# Patient Record
Sex: MALE | Race: WHITE | URBAN - METROPOLITAN AREA
[De-identification: names, ages, dates, MRNs, and addresses within clinical notes are randomized per-mention and may not be internally consistent; named-entity substitution may affect disease eponyms.]

---

## 2023-03-19 ENCOUNTER — HOSPITAL ENCOUNTER (INPATIENT)
Age: 78
DRG: 560 | End: 2023-03-19
Attending: PHYSICAL MEDICINE & REHABILITATION | Admitting: PHYSICAL MEDICINE & REHABILITATION
Payer: MEDICARE

## 2023-03-19 DIAGNOSIS — M51.16 LUMBAR DISC DISEASE WITH RADICULOPATHY: Primary | ICD-10-CM

## 2023-03-19 LAB
GLUCOSE BLD-MCNC: 134 MG/DL (ref 70–99)
GLUCOSE BLD-MCNC: 209 MG/DL (ref 70–99)
PERFORMED ON: ABNORMAL
PERFORMED ON: ABNORMAL

## 2023-03-19 PROCEDURE — 6370000000 HC RX 637 (ALT 250 FOR IP): Performed by: PHYSICAL MEDICINE & REHABILITATION

## 2023-03-19 PROCEDURE — 1280000000 HC REHAB R&B

## 2023-03-19 RX ORDER — TAMSULOSIN HYDROCHLORIDE 0.4 MG/1
0.4 CAPSULE ORAL DAILY
Status: ON HOLD | COMMUNITY
End: 2023-03-30 | Stop reason: SDUPTHER

## 2023-03-19 RX ORDER — LISINOPRIL 40 MG/1
40 TABLET ORAL DAILY
Status: ON HOLD | COMMUNITY
End: 2023-03-30 | Stop reason: SDUPTHER

## 2023-03-19 RX ORDER — ACETAMINOPHEN 325 MG/1
650 TABLET ORAL EVERY 6 HOURS PRN
Status: DISCONTINUED | OUTPATIENT
Start: 2023-03-19 | End: 2023-03-31 | Stop reason: HOSPADM

## 2023-03-19 RX ORDER — ATENOLOL 50 MG/1
50 TABLET ORAL DAILY
Status: DISCONTINUED | OUTPATIENT
Start: 2023-03-20 | End: 2023-03-31 | Stop reason: HOSPADM

## 2023-03-19 RX ORDER — AMLODIPINE BESYLATE 10 MG/1
10 TABLET ORAL DAILY
Status: DISCONTINUED | OUTPATIENT
Start: 2023-03-20 | End: 2023-03-31 | Stop reason: HOSPADM

## 2023-03-19 RX ORDER — LANOLIN ALCOHOL/MO/W.PET/CERES
3 CREAM (GRAM) TOPICAL NIGHTLY PRN
Status: DISCONTINUED | OUTPATIENT
Start: 2023-03-19 | End: 2023-03-31 | Stop reason: HOSPADM

## 2023-03-19 RX ORDER — DEXTROSE MONOHYDRATE 100 MG/ML
INJECTION, SOLUTION INTRAVENOUS CONTINUOUS PRN
Status: DISCONTINUED | OUTPATIENT
Start: 2023-03-19 | End: 2023-03-31 | Stop reason: HOSPADM

## 2023-03-19 RX ORDER — OXYCODONE HYDROCHLORIDE 10 MG/1
10 TABLET ORAL EVERY 4 HOURS PRN
Status: DISCONTINUED | OUTPATIENT
Start: 2023-03-19 | End: 2023-03-31 | Stop reason: HOSPADM

## 2023-03-19 RX ORDER — BISACODYL 10 MG
10 SUPPOSITORY, RECTAL RECTAL DAILY PRN
Status: DISCONTINUED | OUTPATIENT
Start: 2023-03-19 | End: 2023-03-31 | Stop reason: HOSPADM

## 2023-03-19 RX ORDER — AMLODIPINE BESYLATE 10 MG/1
10 TABLET ORAL DAILY
Status: ON HOLD | COMMUNITY
End: 2023-03-30 | Stop reason: HOSPADM

## 2023-03-19 RX ORDER — TAMSULOSIN HYDROCHLORIDE 0.4 MG/1
0.8 CAPSULE ORAL DAILY
Status: DISCONTINUED | OUTPATIENT
Start: 2023-03-20 | End: 2023-03-31 | Stop reason: HOSPADM

## 2023-03-19 RX ORDER — DIAZEPAM 5 MG/1
5 TABLET ORAL EVERY 12 HOURS PRN
Status: ACTIVE | OUTPATIENT
Start: 2023-03-19 | End: 2023-03-22

## 2023-03-19 RX ORDER — ATENOLOL 50 MG/1
50 TABLET ORAL DAILY
COMMUNITY

## 2023-03-19 RX ORDER — METHOCARBAMOL 750 MG/1
750 TABLET, FILM COATED ORAL 4 TIMES DAILY
Status: DISCONTINUED | OUTPATIENT
Start: 2023-03-19 | End: 2023-03-31 | Stop reason: HOSPADM

## 2023-03-19 RX ORDER — LISINOPRIL 40 MG/1
40 TABLET ORAL DAILY
Status: DISCONTINUED | OUTPATIENT
Start: 2023-03-20 | End: 2023-03-24

## 2023-03-19 RX ORDER — HYDRALAZINE HYDROCHLORIDE 10 MG/1
10 TABLET, FILM COATED ORAL EVERY 6 HOURS PRN
Status: DISCONTINUED | OUTPATIENT
Start: 2023-03-19 | End: 2023-03-31 | Stop reason: HOSPADM

## 2023-03-19 RX ORDER — OXYCODONE HYDROCHLORIDE 5 MG/1
5 TABLET ORAL EVERY 4 HOURS PRN
Status: DISCONTINUED | OUTPATIENT
Start: 2023-03-19 | End: 2023-03-31 | Stop reason: HOSPADM

## 2023-03-19 RX ORDER — ONDANSETRON 4 MG/1
4 TABLET, FILM COATED ORAL EVERY 8 HOURS PRN
Status: DISCONTINUED | OUTPATIENT
Start: 2023-03-19 | End: 2023-03-31 | Stop reason: HOSPADM

## 2023-03-19 RX ORDER — ALLOPURINOL 300 MG/1
300 TABLET ORAL DAILY
Status: DISCONTINUED | OUTPATIENT
Start: 2023-03-20 | End: 2023-03-31 | Stop reason: HOSPADM

## 2023-03-19 RX ORDER — ROSUVASTATIN CALCIUM 10 MG/1
5 TABLET, COATED ORAL NIGHTLY
Status: DISCONTINUED | OUTPATIENT
Start: 2023-03-20 | End: 2023-03-31 | Stop reason: HOSPADM

## 2023-03-19 RX ORDER — INSULIN LISPRO 100 [IU]/ML
0-4 INJECTION, SOLUTION INTRAVENOUS; SUBCUTANEOUS
Status: DISCONTINUED | OUTPATIENT
Start: 2023-03-19 | End: 2023-03-31 | Stop reason: HOSPADM

## 2023-03-19 RX ORDER — ATORVASTATIN CALCIUM 10 MG/1
10 TABLET, FILM COATED ORAL DAILY
Status: ON HOLD | COMMUNITY
End: 2023-03-20

## 2023-03-19 RX ORDER — POLYETHYLENE GLYCOL 3350 17 G/17G
17 POWDER, FOR SOLUTION ORAL DAILY PRN
Status: DISCONTINUED | OUTPATIENT
Start: 2023-03-19 | End: 2023-03-31 | Stop reason: HOSPADM

## 2023-03-19 RX ORDER — SENNA AND DOCUSATE SODIUM 50; 8.6 MG/1; MG/1
1 TABLET, FILM COATED ORAL 2 TIMES DAILY
Status: DISCONTINUED | OUTPATIENT
Start: 2023-03-19 | End: 2023-03-22

## 2023-03-19 RX ORDER — INSULIN LISPRO 100 [IU]/ML
0-4 INJECTION, SOLUTION INTRAVENOUS; SUBCUTANEOUS NIGHTLY
Status: DISCONTINUED | OUTPATIENT
Start: 2023-03-19 | End: 2023-03-31 | Stop reason: HOSPADM

## 2023-03-19 RX ORDER — ALLOPURINOL 300 MG/1
300 TABLET ORAL DAILY
COMMUNITY

## 2023-03-19 RX ADMIN — METFORMIN HYDROCHLORIDE 500 MG: 500 TABLET ORAL at 16:29

## 2023-03-19 RX ADMIN — Medication 3 MG: at 20:45

## 2023-03-19 RX ADMIN — OXYCODONE 5 MG: 5 TABLET ORAL at 20:45

## 2023-03-19 RX ADMIN — OXYCODONE 5 MG: 5 TABLET ORAL at 15:04

## 2023-03-19 RX ADMIN — METHOCARBAMOL TABLETS 750 MG: 750 TABLET, COATED ORAL at 16:29

## 2023-03-19 RX ADMIN — METHOCARBAMOL TABLETS 750 MG: 750 TABLET, COATED ORAL at 20:45

## 2023-03-19 RX ADMIN — SENNOSIDES AND DOCUSATE SODIUM 1 TABLET: 50; 8.6 TABLET ORAL at 20:45

## 2023-03-19 ASSESSMENT — PAIN DESCRIPTION - LOCATION
LOCATION: BACK
LOCATION: BACK

## 2023-03-19 ASSESSMENT — PAIN DESCRIPTION - FREQUENCY
FREQUENCY: INTERMITTENT
FREQUENCY: CONTINUOUS

## 2023-03-19 ASSESSMENT — PAIN DESCRIPTION - ORIENTATION
ORIENTATION: MID
ORIENTATION: MID

## 2023-03-19 ASSESSMENT — PAIN DESCRIPTION - PAIN TYPE
TYPE: ACUTE PAIN;SURGICAL PAIN
TYPE: SURGICAL PAIN

## 2023-03-19 ASSESSMENT — PAIN DESCRIPTION - DESCRIPTORS
DESCRIPTORS: ACHING
DESCRIPTORS: ACHING

## 2023-03-19 ASSESSMENT — PAIN SCALES - GENERAL
PAINLEVEL_OUTOF10: 2
PAINLEVEL_OUTOF10: 5
PAINLEVEL_OUTOF10: 6

## 2023-03-19 ASSESSMENT — PAIN DESCRIPTION - ONSET
ONSET: ON-GOING
ONSET: ON-GOING

## 2023-03-19 ASSESSMENT — PAIN - FUNCTIONAL ASSESSMENT: PAIN_FUNCTIONAL_ASSESSMENT: ACTIVITIES ARE NOT PREVENTED

## 2023-03-19 NOTE — PROGRESS NOTES
ADMISSION ORIENTATION    [facility] RECORD NUMBER:  [id number]  AGE: 78 y.o. GENDER: male  : 1945  TODAYS DATE:  3/19/2023      Room Orientation     Patient admitted to room 3273 per [] Wheel Chair  [] Bed  [] Recliner  [x] Stretcher  [] Other:. Transferred to:  [x] Bed  [] Recliner  [] Other: .      Patient Required minimum assistance with Walker    Patient was oriented to:  [x] Call Light  [x] Room Phone  [x] TV  [x] Thermostat  [x] Bathroom  [x] Bed and Chair Alarms per Rehab Policy  [x] Closet  [x] Silva Soup and it's Use on Rehab  [] Other:    Patient Verbalized Understanding: Yes  Family Present: Yes      Rehab Orientation     [x] 3 Hours of Therapy  through   [x] Focus and Specialty of Physical, Occupational, and Speech and Language Pathology  [x] Weekly Team Conference and Discharge Planning  [x] Roles of the Rehab Doctor, Consulting Doctors, Nursing, Dietary, and Social Work  [x] Everyday Clothing, including proper footwear, for Therapy  [x] Visiting Hours, Visitor Ages, and Visitors Sleeping Overnight in the Hospital  [x] Visitor Participation in Therapy  [x]  and Sundays on Rehab  [x] Introduction of Welcome Folder, including Rehab Expectations, Nurse Manager's Welcome Letter, Visitor's Guide, and Menu Service  [x] Planning Ahead and Ordering Meals  [x] Falls Contract [x] Signed, [] Copied, and [] Taped to [place]  [] Other:    Patient Verbalized Understanding: Yes  Family Present: Yes    Electronically signed by Nunu Mcleod RN on 3/19/2023 at 7:27 PM

## 2023-03-19 NOTE — PROGRESS NOTES
Ethnicity  \"Are you of , /a, or [place]an origin? \"  Check all that apply:  [x] A. No, not of , /a, or [place] Origin  [] B.  Yes, [place], [place] American, Chicano/a  [] C.  Yes, [address] [place] [address]  [] D.  Yes, [place]  [] E.  Yes, another , , or [place]an origin  [] X. Patient unable to respond    Race  \"What is your race? \"  Check all that apply:  [x] A. White  [] B. Black or   [] C. American [place] [facility] or [place] Native  [] D.  [place] [facility]  [] E. [place]  [] F. Hong Konger  [] G. [place]  [] Vinod Toussaint  [] I. [place]  [] J.  Other   [] K.   [] L. Indonesian or Shari  [] M. Lithuanian  [] N. Other [place]  [] X. Patient unable to respond    High Risk Drug Classes:  Use and Indication    Is taking: Check if the pt is taking any medications by pharmacological classification, not how it is used, in the following classes  Indication noted: If column 1 is checked, check if there is an indication noted for all meds in the drug class Is taking  (check all that apply) Indication noted (check all that apply)   Antipsychotic [] []   Anticoagulant [] []   Antibiotic [] []   Opioid [x] []   Antiplatelet [] []   Hypoglycemic (including insulin) [x] []   None of the above []     Special Treatments, Procedures, and Programs    Check all of the following treatments, procedures, and programs that apply on admission. On admission (check all that apply)   Cancer Treatments   A1. Chemotherapy []           A2. IV []           A3. Oral []           A10. Other []   B1. Radiation []   Respiratory Therapies   C1. Oxygen Therapy []           C2. Continuous []           C3. Intermittent []           C4. High-concentration []   D1. Suctioning []           D2. Scheduled []           D3. As needed []   E1. Tracheostomy Care []   F1.  Invasive Mechanical Ventilator (ventilator or respirator) []   G1. Non-invasive Mechanical Ventilator []           G2. BiPAP [] G3. CPAP []   Other   H1. IV Medications []           H2. Vasoactive medications []           H3. Antibiotics []           H4. Anticoagulation []           H10. Other []   I1. Transfusions []   J1. Dialysis []           J2. Hemodialysis []           J3. Peritoneal dialysis []   O1. IV access []           O2. Peripheral []           O3. Midline []           O4.  Central (PICC, tunneled, port) []      None of the above (select if no Cancer, Respiratory, or Other boxes are checked) [x]

## 2023-03-19 NOTE — PROGRESS NOTES
[facility] [address] at [phone]. Pt to be p/u at 1230. Patient admitted to rehab room [id number] with L2-L4 lumbar fusion. Vitals taken. Pt stable.

## 2023-03-19 NOTE — PROGRESS NOTES
Patient oriented to room, fall contract signed, bracelet applied, assessment completed and charted. All admission questions including  IRFPAI have been completed. Four eyes needs to be completed and signed. This was passed on in report.

## 2023-03-19 NOTE — PROGRESS NOTES
A complete drug regimen review was completed for this patient.      [x]  No clinically significant medication issue was identified    []   Yes, a clinically significant medication issue was identified     []  Adverse Drug Event:      []  Allergy:      []  Side Effect:      []  Ineffective Therapy:      []  Drug Interaction:     []  Duplicated Therapy:     []  Untreated Indication:      []  Non-adherence:     []  Other:      Nursing/Pharmacy contacted the physician:   Date:  3/19/23  Time: 1700     Actions recommended by physician were completed:  Date : N/A Time: N/A    Electronically signed by Michael Jordan RN on 3/19/23 at 7:24 PM EDT

## 2023-03-20 LAB
ANION GAP SERPL CALCULATED.3IONS-SCNC: 11 MMOL/L (ref 3–16)
BASOPHILS # BLD: 0 K/UL (ref 0–0.2)
BASOPHILS NFR BLD: 0.4 %
BUN SERPL-MCNC: 12 MG/DL (ref 7–20)
CALCIUM SERPL-MCNC: 9 MG/DL (ref 8.3–10.6)
CHLORIDE SERPL-SCNC: 102 MMOL/L (ref 99–110)
CO2 SERPL-SCNC: 26 MMOL/L (ref 21–32)
CREAT SERPL-MCNC: 0.6 MG/DL (ref 0.8–1.3)
DEPRECATED RDW RBC AUTO: 15.5 % (ref 12.4–15.4)
EOSINOPHIL # BLD: 0.2 K/UL (ref 0–0.6)
EOSINOPHIL NFR BLD: 2.9 %
GFR SERPLBLD CREATININE-BSD FMLA CKD-EPI: >60 ML/MIN/{1.73_M2}
GLUCOSE BLD-MCNC: 122 MG/DL (ref 70–99)
GLUCOSE BLD-MCNC: 126 MG/DL (ref 70–99)
GLUCOSE BLD-MCNC: 149 MG/DL (ref 70–99)
GLUCOSE BLD-MCNC: 199 MG/DL (ref 70–99)
GLUCOSE SERPL-MCNC: 129 MG/DL (ref 70–99)
HCT VFR BLD AUTO: 34.7 % (ref 40.5–52.5)
HGB BLD-MCNC: 11.9 G/DL (ref 13.5–17.5)
LYMPHOCYTES # BLD: 1.7 K/UL (ref 1–5.1)
LYMPHOCYTES NFR BLD: 23.9 %
MCH RBC QN AUTO: 29 PG (ref 26–34)
MCHC RBC AUTO-ENTMCNC: 34.4 G/DL (ref 31–36)
MCV RBC AUTO: 84.2 FL (ref 80–100)
MONOCYTES # BLD: 0.6 K/UL (ref 0–1.3)
MONOCYTES NFR BLD: 8 %
NEUTROPHILS # BLD: 4.6 K/UL (ref 1.7–7.7)
NEUTROPHILS NFR BLD: 64.8 %
PERFORMED ON: ABNORMAL
PLATELET # BLD AUTO: 241 K/UL (ref 135–450)
PMV BLD AUTO: 6.7 FL (ref 5–10.5)
POTASSIUM SERPL-SCNC: 4.1 MMOL/L (ref 3.5–5.1)
PREALB SERPL-MCNC: 13.9 MG/DL (ref 20–40)
RBC # BLD AUTO: 4.12 M/UL (ref 4.2–5.9)
SODIUM SERPL-SCNC: 139 MMOL/L (ref 136–145)
WBC # BLD AUTO: 7 K/UL (ref 4–11)

## 2023-03-20 PROCEDURE — 1280000000 HC REHAB R&B

## 2023-03-20 PROCEDURE — 97162 PT EVAL MOD COMPLEX 30 MIN: CPT | Performed by: PHYSICAL THERAPIST

## 2023-03-20 PROCEDURE — 6360000002 HC RX W HCPCS: Performed by: PHYSICAL MEDICINE & REHABILITATION

## 2023-03-20 PROCEDURE — 80048 BASIC METABOLIC PNL TOTAL CA: CPT

## 2023-03-20 PROCEDURE — 51798 US URINE CAPACITY MEASURE: CPT

## 2023-03-20 PROCEDURE — 6370000000 HC RX 637 (ALT 250 FOR IP): Performed by: PHYSICAL MEDICINE & REHABILITATION

## 2023-03-20 PROCEDURE — 36415 COLL VENOUS BLD VENIPUNCTURE: CPT

## 2023-03-20 PROCEDURE — 97530 THERAPEUTIC ACTIVITIES: CPT | Performed by: PHYSICAL THERAPIST

## 2023-03-20 PROCEDURE — 85025 COMPLETE CBC W/AUTO DIFF WBC: CPT

## 2023-03-20 PROCEDURE — 97116 GAIT TRAINING THERAPY: CPT | Performed by: PHYSICAL THERAPIST

## 2023-03-20 PROCEDURE — 97530 THERAPEUTIC ACTIVITIES: CPT

## 2023-03-20 PROCEDURE — 84134 ASSAY OF PREALBUMIN: CPT

## 2023-03-20 PROCEDURE — 97167 OT EVAL HIGH COMPLEX 60 MIN: CPT

## 2023-03-20 PROCEDURE — 94760 N-INVAS EAR/PLS OXIMETRY 1: CPT

## 2023-03-20 PROCEDURE — 97535 SELF CARE MNGMENT TRAINING: CPT

## 2023-03-20 RX ORDER — HEPARIN SODIUM 5000 [USP'U]/ML
5000 INJECTION, SOLUTION INTRAVENOUS; SUBCUTANEOUS 2 TIMES DAILY
Status: DISCONTINUED | OUTPATIENT
Start: 2023-03-20 | End: 2023-03-31 | Stop reason: HOSPADM

## 2023-03-20 RX ORDER — ROSUVASTATIN CALCIUM 5 MG/1
5 TABLET, COATED ORAL NIGHTLY
COMMUNITY

## 2023-03-20 RX ADMIN — METHOCARBAMOL TABLETS 750 MG: 750 TABLET, COATED ORAL at 20:27

## 2023-03-20 RX ADMIN — ALLOPURINOL 300 MG: 300 TABLET ORAL at 08:53

## 2023-03-20 RX ADMIN — OXYCODONE 5 MG: 5 TABLET ORAL at 22:07

## 2023-03-20 RX ADMIN — Medication 3 MG: at 22:08

## 2023-03-20 RX ADMIN — ROSUVASTATIN CALCIUM 5 MG: 10 TABLET, FILM COATED ORAL at 20:33

## 2023-03-20 RX ADMIN — AMLODIPINE BESYLATE 10 MG: 10 TABLET ORAL at 08:53

## 2023-03-20 RX ADMIN — SENNOSIDES AND DOCUSATE SODIUM 1 TABLET: 50; 8.6 TABLET ORAL at 20:33

## 2023-03-20 RX ADMIN — LISINOPRIL 40 MG: 40 TABLET ORAL at 08:53

## 2023-03-20 RX ADMIN — ATENOLOL 50 MG: 50 TABLET ORAL at 08:53

## 2023-03-20 RX ADMIN — METHOCARBAMOL TABLETS 750 MG: 750 TABLET, COATED ORAL at 08:52

## 2023-03-20 RX ADMIN — METFORMIN HYDROCHLORIDE 500 MG: 500 TABLET ORAL at 16:52

## 2023-03-20 RX ADMIN — SENNOSIDES AND DOCUSATE SODIUM 1 TABLET: 50; 8.6 TABLET ORAL at 08:53

## 2023-03-20 RX ADMIN — METHOCARBAMOL TABLETS 750 MG: 750 TABLET, COATED ORAL at 16:52

## 2023-03-20 RX ADMIN — TAMSULOSIN HYDROCHLORIDE 0.8 MG: 0.4 CAPSULE ORAL at 08:52

## 2023-03-20 RX ADMIN — OXYCODONE HYDROCHLORIDE 10 MG: 10 TABLET ORAL at 00:54

## 2023-03-20 RX ADMIN — HEPARIN SODIUM 5000 UNITS: 5000 INJECTION INTRAVENOUS; SUBCUTANEOUS at 20:35

## 2023-03-20 RX ADMIN — METFORMIN HYDROCHLORIDE 500 MG: 500 TABLET ORAL at 08:52

## 2023-03-20 RX ADMIN — METHOCARBAMOL TABLETS 750 MG: 750 TABLET, COATED ORAL at 12:16

## 2023-03-20 ASSESSMENT — PAIN DESCRIPTION - PAIN TYPE: TYPE: SURGICAL PAIN

## 2023-03-20 ASSESSMENT — PAIN DESCRIPTION - ORIENTATION
ORIENTATION: RIGHT
ORIENTATION: MID
ORIENTATION: RIGHT

## 2023-03-20 ASSESSMENT — PAIN SCALES - GENERAL
PAINLEVEL_OUTOF10: 6
PAINLEVEL_OUTOF10: 7
PAINLEVEL_OUTOF10: 0
PAINLEVEL_OUTOF10: 6
PAINLEVEL_OUTOF10: 0

## 2023-03-20 ASSESSMENT — PAIN SCALES - WONG BAKER
WONGBAKER_NUMERICALRESPONSE: 0
WONGBAKER_NUMERICALRESPONSE: 4
WONGBAKER_NUMERICALRESPONSE: 0

## 2023-03-20 ASSESSMENT — PAIN - FUNCTIONAL ASSESSMENT
PAIN_FUNCTIONAL_ASSESSMENT: PREVENTS OR INTERFERES SOME ACTIVE ACTIVITIES AND ADLS
PAIN_FUNCTIONAL_ASSESSMENT: PREVENTS OR INTERFERES SOME ACTIVE ACTIVITIES AND ADLS

## 2023-03-20 ASSESSMENT — PAIN DESCRIPTION - DESCRIPTORS
DESCRIPTORS: ACHING

## 2023-03-20 ASSESSMENT — PAIN DESCRIPTION - ONSET: ONSET: ON-GOING

## 2023-03-20 ASSESSMENT — PAIN DESCRIPTION - FREQUENCY: FREQUENCY: CONTINUOUS

## 2023-03-20 ASSESSMENT — PAIN DESCRIPTION - LOCATION
LOCATION: HIP
LOCATION: HIP
LOCATION: BACK

## 2023-03-20 NOTE — PLAN OF CARE
Problem: Discharge Planning  Goal: Discharge to home or other facility with appropriate resources  Outcome: Progressing  Flowsheets (Taken 3/20/2023 6778)  Discharge to home or other facility with appropriate resources:   Identify barriers to discharge with patient and caregiver   Arrange for needed discharge resources and transportation as appropriate   Identify discharge learning needs (meds, wound care, etc)   Refer to discharge planning if patient needs post-hospital services based on physician order or complex needs related to functional status, cognitive ability or social support system     Problem: Skin/Tissue Integrity  Goal: Absence of new skin breakdown  Description: 1. Monitor for areas of redness and/or skin breakdown  2. Assess vascular access sites hourly  3. Every 4-6 hours minimum:  Change oxygen saturation probe site  4. Every 4-6 hours:  If on nasal continuous positive airway pressure, respiratory therapy assess nares and determine need for appliance change or resting period.   Outcome: Progressing     Problem: Safety - Adult  Goal: Free from fall injury  3/20/2023 1304 by Gian Mike RN  Outcome: Progressing  Flowsheets (Taken 3/20/2023 1303)  Free From Fall Injury: Roque Pack family/caregiver on patient safety     Problem: ABCDS Injury Assessment  Goal: Absence of physical injury  Outcome: Progressing  Flowsheets (Taken 3/20/2023 1303)  Absence of Physical Injury: Implement safety measures based on patient assessment     Problem: Pain  Goal: Verbalizes/displays adequate comfort level or baseline comfort level  3/20/2023 1304 by Gian Mike RN  Outcome: Progressing  Flowsheets (Taken 3/20/2023 0851)  Verbalizes/displays adequate comfort level or baseline comfort level:   Encourage patient to monitor pain and request assistance   Assess pain using appropriate pain scale

## 2023-03-20 NOTE — H&P
Department of Physical Medicine & Rehabilitation  History & Physical      Patient Identification:  Omar Mclaughlin  : 1945  Admit date: 3/19/2023   Attending provider: Tree Covarrubias MD        Primary care provider: No primary care provider on file. Chief Complaint: back and leg pain, weakness    History of Present Illness/Hospital Course:  Patient is a 79 yo M with pmh HTN, HLD, DM2, obesity, gout, and lumbar DDD with radiculopathy (s/p prior L3-S1 laminectomy and L4-S1 fusion with Dr. Andreia Horta) who initially presented to [address] [place] [address] on 3/13/2023 for planned spine surgery. He has history of severe back and bilateral leg pain. MRI showed evidence of L2-3, L3-4 DDD, spondylosis, and stenosis. He did not respond to conservative management. Therefore decision was made to undergo L2-L4 TLIF (3/13 with Dr. Juve Bone). Post op course notable for acute hypoxic respiratory failure (requiring 3L O2), hyperglycemia, anemia, and urinary retention. Now presents to ARU with impaired mobility and self-care below his baseline. Currently, patient reports moderate low back pain with radiation into bilateral buttocks and intermittently down right thigh. Worse with movement. Improves with rest. He has baseline tingling/numbness in right lateral lower leg and soles of feet. Since surgery he has felt generally weak and has also had intermittent right leg buckling.  He is motivated to start inpatient rehab program.     Prior Level of Function:  Independent for mobility, ADLs, and IADLs    Current Level of Function:  Mod-maxA x 2 person for mobility  Up to maxA for ADLs    Pertinent Social History:  Support: lives with spouse  Home set-up: 1 level, no steps to enter    Past Medical History:   Diagnosis Date    BPH (benign prostatic hyperplasia)     Cervical stenosis of spinal canal     DM2 (diabetes mellitus, type 2) ([facility] [address]     Essential hypertension     Gout     Hyperlipidemia     Lumbar stenosis        Past Surgical History:   Procedure Laterality Date    LUMBAR SPINE SURGERY      L3-S1 laminectomy and L4-S1 fusion with Dr. Eliel June  03/13/2023    L2-L4 TLIF with Dr. Issa Villagomez       Family History   Problem Relation Age of Onset    Cancer Mother         ovarian    Stroke Father     Heart Disease Brother          Social History     Socioeconomic History    Marital status:      Spouse name: None    Number of children: None    Years of education: None    Highest education level: None   Tobacco Use    Smoking status: Never    Smokeless tobacco: Never   Vaping Use    Vaping Use: Never used   Substance and Sexual Activity    Alcohol use:  Yes     Alcohol/week: 1.0 standard drink     Types: 1 Cans of beer per week     Comment: a couple per month    Drug use: Never    Sexual activity: Yes       No Known Allergies      Current Facility-Administered Medications   Medication Dose Route Frequency Provider Last Rate Last Admin    heparin (porcine) injection 5,000 Units  5,000 Units SubCUTAneous BID Eriberto Squires MD        diazePAM (VALIUM) tablet 5 mg  5 mg Oral Q12H PRN Eriberto Squires MD        methocarbamol (ROBAXIN) tablet 750 mg  750 mg Oral 4x Daily Eriberto Squires MD   750 mg at 03/20/23 0804    ondansetron [facility]) tablet 4 mg  4 mg Oral Q8H PRN Eriberto Squires MD        melatonin tablet 3 mg  3 mg Oral Nightly PRN Eriberto Squires MD   3 mg at 03/19/23 2045    hydrALAZINE (APRESOLINE) tablet 10 mg  10 mg Oral Q6H PRN Eriberto Squires MD        bisacodyl (DULCOLAX) suppository 10 mg  10 mg Rectal Daily PRN Eriberto Squires MD        oxyCODONE HCl (OXY-IR) immediate release tablet 10 mg  10 mg Oral Q4H PRN Eriberto Squires MD   10 mg at 03/20/23 0054    oxyCODONE (ROXICODONE) immediate release tablet 5 mg  5 mg Oral Q4H PRN Eriberto Squires MD   5 mg at 03/19/23 2045    tamsulosin (FLOMAX) capsule 0.8 mg  0.8 mg Oral Daily Eriberto Squires MD   0.8 mg at 03/20/23 2858 or gallop noted  Resp: Lungs clear to auscultation bilaterally, no rales wheezes or rhonchi, no retractions. Respirations unlabored. GI: Soft, nontender, nondistended. Normal bowel sounds. No palpable masses. Skin: Lumbar incisions well approximated, no surrounding erythema. Mild serosanguinous drainage from lower left incision. Normal temperature and turgor. No rashes or breakdown noted on exposed areas. Ext: No significant edema appreciated. No varicosities. MSK: Decreased spine ROM. No joint tenderness, erythema, warmth noted. Neuro:   -Mental status: Alert. Oriented to person, place, time, situation.  -Language: Speech fluent  -Cranial nerves: VFF, PERRL, EOMI, Facial sensation intact, Face symmetric, Hearing intact, Palate elevation symmetric, Shoulder shrug intact. Tongue midline.   -Sensation intact to light touch but subjectively decreased/abnormal in the RL4-S1 dermatomes and left S1 dermatome.    -Motor examination reveals strength 5/5 in BUE, 4/5 hip flexion, 4/5 right knee extension, 5/5 left knee extension, 5/5 ankld DF and PF.   -No abnormalities with finger/nose noted. -Reflexes diminished. Negative Michael  Psych: Stable mood, normal judgement, normal affect     Lab Results   Component Value Date    WBC 7.0 03/20/2023    HGB 11.9 (L) 03/20/2023    HCT 34.7 (L) 03/20/2023    MCV 84.2 03/20/2023     03/20/2023     No results found for: INR, PROTIME  Lab Results   Component Value Date    CREATININE 0.6 (L) 03/20/2023    BUN 12 03/20/2023     03/20/2023    K 4.1 03/20/2023     03/20/2023    CO2 26 03/20/2023     No results found for: ALT, AST, GGT, ALKPHOS, BILITOT    Available laboratory, medical testing, and imaging data from [facility] has been reviewed.      Lab Type Date Time Lab

## 2023-03-20 NOTE — PROGRESS NOTES
level; Laundry in basement;Able to Live on Main level with bedroom/bathroom [facility])  Home Access: Level entry;Stairs to enter without rails  Entrance Stairs - Number of Steps: No steps at front door; 1 step from garage (8-9 stairs w/ B handrails from basement to 1st floor)  Bathroom Shower/Tub: Walk-in shower; Shower chair without back  [employer]: Handicap height (17')  Bathroom Accessibility: Accessible  Home Equipment: Cane, quad;Walker, standard; Reacher  Has the patient had two or more falls in the past year or any fall with injury in the past year?: No  Receives Help From: Family  ADL Assistance: [address]: [address] [facility] Responsibilities: Yes  Ambulation Assistance: Independent (would walk w/ cart to lean on in grocery store)  Transfer Assistance: Independent  Active : Yes  Mode of Transportation: Truck;Car (F1-50)  Occupation: Retired  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[employer])  [facility]: fishing, boating, watching football on TV, fixing things      Objective     Cognition  Overall Cognitive Status: [facility]  Orientation  Overall Orientation Status: Within Functional Limits   Sensation  Overall Sensation Status: WFL (UE)   ROM  LUE AROM (degrees)  LUE AROM : WFL  RUE AROM (degrees)  RUE AROM : WFL  LUE Strength  Gross LUE Strength: WFL  LUE Strength Comment: shoulders NT, functionl for ADL  RUE Strength  RUE Strength Comment: shoulders NT, functionl for ADL  Fine Motor Skills  Coordination  Movements Are Fluid And Coordinated: Yes            Functional Mobility  Balance  Sitting Balance: Supervision  Standing Balance: Moderate assistance (min/mod right knee buckling)  Shower Transfers  Shower - Transfer Type: To and From  Shower - Transfer To: Shower seat with back  Shower - Technique: Stand pivot  Shower Transfers:  Moderate assistance  Shower Transfers Comments: knee buckling, wheelchair to shower chair with grab bar  Wheelchair Bed Transfers  Wheelchair/Bed - Technique: Stand pivot  Equipment Used: Wheelchair;Bed  Level of Asssistance: Moderate assistance  Wheelchair Transfers Comments: due to knee buckling in PT  ADL  Grooming: Setup  Grooming Skilled Clinical Factors: combed hair per self after set up, stated he completed oral care after set up this AM  UE Bathing: Setup  LE Bathing: Moderate assistance  LE Bathing Skilled Clinical Factors: assist for feet, leaned to side for buttocks, assisted to dry in stance. Seated for majority of shower  UE Dressing: Setup  LE Dressing: Maximum assistance  LE Dressing Skilled Clinical Factors: assist to thread briefs and pants over feet, stood with min assist to have pants pulled over hips. Pt assisted approx 25%. Assisted with slipper socks  Toileting Skilled Clinical Factors: sheikh removed prior to shower    Goals  Patient Goals   Patient goals : \"I want to be able to walk around, get to the bathroom, do most of my bathing and dressing  -- my wife can do my socks\"  Short Term Goals  Time Frame for Short Term Goals: 1 week pt will. .  Short Term Goal 1: bathe with min assist and AD  Short Term Goal 2: dress UB with set up, LB with min assist and AD  Short Term Goal 3: toilet with min assist and grab bars  Short Term Goal 4: transfers and functional mobility with CGA and AD  Short Term Goal 5: increase activity tolerance to stand 3 min for ADL/IADL tasks  Long Term Goals  Time Frame for Long Term Goals : 10-14 days pt will. .  Long Term Goal 1: bathe indep with AD  Long Term Goal 2: dress UB and LB indep with AD as needed  Long Term Goal 3: toilet indep with grab bar for safety  Long Term Goal 4: transfers and functional mobiltiy indep with AD, simple meal prep indep  Long Term Goal 5: assess for DME    Assessment  Performance deficits / Impairments: Decreased functional mobility ; Decreased ADL status; Decreased safe awareness;Decreased balance;Decreased endurance;Decreased high-level IADLs  Assessment: hyperglycemia, anemia, and urinary retention. Now presents to ARU with impaired mobility and self-care below his baseline. Currently, patient reports moderate low back pain with radiation into bilateral buttocks and intermittently down right thigh. Worse with movement. Improves with rest. He has baseline tingling/numbness in right lateral lower leg and soles of feet. Since surgery he has felt generally weak and has also had intermittent right leg buckling. He is motivated to start inpatient rehab program.  (copied per note Dr Ana Beckham 3/20/23)  Exam: bathing, dressing, transfers, mobiltiy, cog, UE, activity tolerance  Assistance / Modification: mod bathing, shower chair, grab bar, dress LB with max, transfers with mod - wheelchair and grab bar  Discharge Recommendations: Home with Home health OT; Home with assist PRN  Occupational Therapy Plan  Times Per Day: Twice a day  Days Per Week: 5 Days  Hours Per Day: 1.5 hours  Therapy Duration: 2 Weeks (10-14 days)  Current Treatment Recommendations: Strengthening;Balance training;Functional mobility training; Endurance training; Safety education & training;Equipment evaluation, education, & procurement;Patient/Caregiver education & training;Self-Care / ADL; Home management training       Therapy Time   Individual Concurrent Group Co-treatment   Time In 3488         Time Out 1515         Minutes 90         Timed Code Treatment Minutes: 75 Minutes (+15 min eval)       Eliceo Norton OTR/L 21

## 2023-03-20 NOTE — PROGRESS NOTES
4 Eyes Admission Assessment     I agree as the admission nurse that 2 RN's have performed a thorough Head to Toe Skin Assessment on the patient. ALL assessment sites listed below have been assessed on admission. Areas assessed by both nurses:   [x]   Head, Face, and Ears   [x]   Shoulders, Back, and Chest  [x]   Arms, Elbows, and Hands   [x]   Coccyx, Sacrum, and Ischium  [x]   Legs, Feet, and Heels        Does the Patient have Skin Breakdown?   No         Levar Prevention initiated:  No   Wound Care Orders initiated:  No      [facility] nurse consulted for Pressure Injury (Stage 3,4, Unstageable, DTI, NWPT, and Complex wounds) or Levar score 18 or lower:  No      Nurse 1 eSignature: Electronically signed by Tyrell Castillo RN on 3/20/23 at 2:11 AM EDT    **SHARE this note so that the co-signing nurse is able to place an eSignature**    Nurse 2 eSignature: Electronically signed by Basilio Rios RN on 3/20/23 at 5:43 AM EDT

## 2023-03-20 NOTE — CONSULTS
Comprehensive Nutrition Assessment    Type and Reason for Visit:  Consult, Initial (New to ARU)    Nutrition Recommendations/Plan:   Continue Regular 5 CHO (75 gm/meal) Diet     Malnutrition Assessment:  Malnutrition Status:  No malnutrition (03/20/23 1310)    Context:  Acute Illness     Findings of the 6 clinical characteristics of malnutrition:  Energy Intake:  No significant decrease in energy intake  Weight Loss:  No significant weight loss     Body Fat Loss:  Mild body fat loss (likely d/t advanced age) Orbital   Muscle Mass Loss:  No significant muscle mass loss    Fluid Accumulation:  Mild Extremities (BLE Trace edema)   Strength:  Not Performed    Nutrition Assessment:    Consult. Pt with PMH of DM2, HTN, HLD admitted to ARU with impaired mobility and self care below baseline r/t lumbar disc disease with radiculopathy. S/p L2-L4 TLIF on 3/13. Per EMR, PO intakes are usually %. Pt says he hasn't noticed a wt change. Unable to confirm since wt hx is limited per EMR. No nutrition interventions at this time. Will continue to monitor to assess for wt trends. Nutrition Related Findings:    Labs reviewed. POCG 149. 0.6 Creat. BLE Trace edema noted. LBM 3/18. Wound Type: Surgical Incision, Multiple (3 incisions)       Current Nutrition Intake & Therapies:    Average Meal Intake: %  Average Supplements Intake: None Ordered  ADULT DIET; Regular; 5 carb choices (75 gm/meal)    Anthropometric Measures:  Height: 5' 8\" (172.7 cm)  Ideal Body Weight (IBW): 154 lbs (70 kg)       Current Body Weight: 226 lb (102.5 kg), 146.8 % IBW. Weight Source: Bed Scale  Current BMI (kg/m2): 34.4        Weight Adjustment For: No Adjustment                 BMI Categories: Obese Class 1 (BMI 30.0-34. 9)    Estimated Daily Nutrient Needs:  Energy Requirements Based On: Kcal/kg  Weight Used for Energy Requirements: Current  Energy (kcal/day): 8566-0049 kcal (15-18 kcal/kg CBW)  Weight Used for Protein Requirements: Ideal  Protein (g/day):  gm (1.2-2.0 gm/kg IBW)  Method Used for Fluid Requirements: 1 ml/kcal  Fluid (ml/day): 7847-7083 mL    Nutrition Diagnosis:   No nutrition diagnosis at this time    Nutrition Interventions:   Food and/or Nutrient Delivery: Continue Current Diet  Nutrition Education/Counseling: No recommendation at this time  Coordination of Nutrition Care: Continue to monitor while inpatient       Goals:     Goals: PO intake 75% or greater       Nutrition Monitoring and Evaluation:   Behavioral-Environmental Outcomes: None Identified  Food/Nutrient Intake Outcomes: Food and Nutrient Intake  Physical Signs/Symptoms Outcomes: Biochemical Data, Nutrition Focused Physical Findings, Skin, Weight, Meal Time Behavior, Fluid Status or Edema    Discharge Planning:    Continue current diet     Ailin 81: [id number]

## 2023-03-20 NOTE — PROGRESS NOTES
Pt alert and oriented x4, VSS, neuro checks are WDL. Moderate dorsi/plantar flexion. Incisions on mid and lower back have old bloody drainage. Lower back is bruised. Pt ambulated to bathroom with walker and one person assist. Pt tolerated it well and showed a steady gait. Bed alarm on, bedside table and call light in reach.

## 2023-03-20 NOTE — PROGRESS NOTES
Pharmacy Medication Reconciliation Note     List of medications patient is currently taking is complete. Source of information:   1. Patient supplied list      Notes regarding home medications:   1.  Changed Atorvastatin to Rosuvastatin 5mg      Denies taking any other OTC or herbal medications    Dane Oh [facility] 3/20/2023 2:07 PM

## 2023-03-20 NOTE — PROGRESS NOTES
without rails  Entrance Stairs - Number of Steps: No steps at front door; 1 step from garage (8-9 stairs w/ B handrails from basement to 1st floor)  Bathroom Shower/Tub: Walk-in shower, Shower chair without back  Bathroom Toilet: Handicap height (17')  Bathroom Accessibility: Accessible  Home Equipment: Cane, quad, Walker, standard, Reacher  Has the patient had two or more falls in the past year or any fall with injury in the past year?: No  Receives Help From: Family  ADL Assistance: [address]: Independent  Homemaking Responsibilities: Yes  Ambulation Assistance: Independent (would walk w/ cart to lean on in grocery store)  Transfer Assistance: Independent  Active : Yes  Mode of Transportation: Truck, Car (F1-50)  Occupation: Retired  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[employer])  [address]: fishing, boating, watching football on TV, fixing things      OBJECTIVE  Vision  Vision: Impaired  Vision Exceptions: Wears glasses for reading;Cataracts    Hearing  Hearing: Within functional limits    Cognition  Overall Cognitive Status: WNL  Cognition Comment: BIMS 14/15    ROM  AROM RLE (degrees)  RLE AROM: Exceptions  RLE General AROM: Decreased hip flexion and knee extension  AROM LLE (degrees)  LLE AROM : WFL    Strength  Strength RLE  R Hip Flexion: 3-/5  R Hip ABduction: 4/5  R Knee Flexion: 4/5  R Knee Extension: 3/5  R Ankle Dorsiflexion: 4/5  R Ankle Plantar flexion: 4/5  Strength LLE  Strength LLE: Exception  L Hip Flexion: 4/5  L Hip ABduction: 4/5  L Knee Flexion: 4/5  L Knee Extension: 4/5  L Ankle Dorsiflexion: 4/5  L Ankle Plantar Flexion: 4/5    Sensation  Overall Sensation Status: Impaired  Light Touch: Partial deficits in the RLE (Diminished L3-5)    Functional Mobility  Bed mobility  Bridging: Stand by assistance  Rolling to Left: Stand by assistance  Rolling to Right: Stand by assistance  Supine to Sit: Minimal assistance (log roll without activities  Safety Devices  Type of Devices: Bed alarm in place;Call light within reach; Left in bed;Patient at risk for falls;Gait belt    EDUCATION  Education  Education Given To: Patient  Education Provided: Role of Therapy;Plan of Care;Precautions; Safety; Mobility Training  Education Provided Comments: Educated on walker management with sit <> stand transfer and w/ car transfer, curb step  Education Method: Demonstration;Verbal  Barriers to Learning: None  Education Outcome: Verbalized understanding;Continued education needed;Demonstrated understanding    ELOS:   7-10 days    Therapy Time   Individual Concurrent Group Co-treatment   Time In 1115         Time Out 1200         Minutes 45           Timed Code Treatment Minutes: 30 Minutes    Second Session Therapy Time     Individual Co-treatment   Time In 451 [facility] 8898     Minutes 1905 [facility] 97 [facility], [facility]  Therapist was present, directed the patient's care, made skilled judgement, and was responsible for assessment and treatment of the patient.         Emmanuel Alcaraz, 03/20/23 at 5:23 PM    Una Evans, [address]

## 2023-03-20 NOTE — PLAN OF CARE
ARU PATIENT TREATMENT PLAN  [address]  [phone]    Alyssia Arellano    : 1945  Acct #: [de-identified]  MRN: [id number]   PHYSICIAN:  Minal Ramirez MD  Primary Problem    Patient Active Problem List   Diagnosis    Lumbar disc disease with radiculopathy       Rehabilitation Diagnosis:     Lumbar disc disease with radiculopathy [M51.16]       ADMIT DATE:3/19/2023    Patient Goals: \"I want to be able to walk around, get to the bathroom, do most of my bathing and dressing  -- my wife can do my socks\"    Admitting Impairments: RLE>LLE weakness, sensory deficit, decreased balance, spine ROM     Lumbar DDD with bilateral radiculopathy  -s/p L2-4 TLIF (3/13 with Dr. Rudy Wilson)  -Wound care per Nsgy  -PT/OT     Acute blood loss anemia   -Post-surgical.   -Monitor Hgb, transfuse prn <7. HTN  -amlodipine, atenolol, lisinopril     HLD  -rosuvastatin     DM2 with hyperglycemia  -metformin, ISS     Gout  -allopurinol     Morbid Obesity   -Complicating assessment and treatment. Placing patient at risk for multiple co-morbidities as well as early death and contributing to the patient's presentation.   -Dietician consult. Counseling and education.      Bladder: Urinary retention, h/o BPH  -Remove Sheikh for void trial   -Monitor PVRs, SC prn >500cc  -Flomax  Barriers: RLE>LLE weakness, sensory deficit, decreased balance, spine ROM, medical comorbidities  Participation: good     CARE PLAN     NURSING:  Alyssia Arellano while on this unit will:     [] Be continent of bowel and bladder     [x] Have an adequate number of bowel movements  [x] Urinate with no urinary retention >300ml in bladder  [] Complete bladder protocol with sheikh removal  [x] Maintain O2 SATs at 92%  [x] Have pain managed while on ARU       [] Be pain free by discharge   [x] Have no skin breakdown while on ARU  [] Have improved skin integrity via wound measurements  [x] Have no signs/symptoms of infection at the wound site  [x] Be free from injury during hospitalization   [x] Complete education with patient/family with understanding demonstrated for:Lumbar disc TLIF  [x] Adjustment   [x] Other:   Nursing interventions may include bowel/bladder training, education for medical assistive devices, medication education, O2 saturation management, energy conservation, stress management techniques, fall prevention, alarms protocol, seating and positioning, skin/wound care, pressure relief instruction,dressing changes,  infection protection, DVT prophylaxis, and/or assistance with in room safety with transfers to bed, toilet, wheelchair, shower as well as bathroom activities and hygiene. Patient/caregiver education for:   [] Disease/sustained injury/management      [] Medication Use   [] Surgical intervention   [] Safety   [] Body mechanics and or joint protection   [] Health maintenance         PHYSICAL THERAPY:  Goals:                  Short Term Goals  Time Frame for Short Term Goals: 1 week  Short Term Goal 1: Amb 150' w/ RW at supervision  Short Term Goal 2: Navigate curb step w/ RW at SBA  Short Term Goal 3: Bed mobility at [facility]  Short Term Goal 4: Transfers w/ RW at [facility]  Short Term Goal 5: evaluate stairs            Long Term Goals  Time Frame for Long Term Goals : STG = LTG  These goals were reviewed with this patient at the time of assessment and Tia Man is in agreement. Plan of Care: Pt to be seen 90 mins per day for 5-6 day/week 7-10 days.                    Current Treatment Recommendations: Strengthening, ROM, Balance training, Functional mobility training, Transfer training, ADL/Self-care training, Endurance training, Gait training, Stair training, Manual, Home exercise program, Safety education & training, Patient/Caregiver education & training, Equipment evaluation, education, & procurement, Therapeutic activities      OCCUPATIONAL THERAPY:  Goals: 1 Step     4 Steps     12 Steps     Picking up Object     Wheel 50 feet with 2 Turns   Type? Wheel 150 feet with 2 Turns   Type? Jazzy Gilliam will be seen a minimum of 3 hours of therapy per day, a minimum of 5 out of 7 days per week. [] In this rare instance due to the nature of this patient's medical involvement, this patient will be seen 15 hours per week (900 minutes within a 7 day period). Treatments may include therapeutic exercises, gait training, neuromuscular re-ed, transfer training, community reintegration, bed mobility, w/c mobility and training, self care, home mgmt, cognitive training, energy conservation,dysphagia tx, speech/language/communication therapy, group therapy, and patient/family education. In addition, dietician/nutritionist may monitor calorie count as well as intake and collaboratively work with SLP on dietary upgrades. Neuropsychology/Psychology may evaluate and provide necessary support. Medical issues being managed closely and that require 24 hour availability of a physician:   [] Swallowing Precautions  [x] Bowel/Bladder Fx  [] Weight bearing precautions   [x] Wound Care    [x] Pain Mgmt   [x] Infection Protection   [x] DVT Prophylaxis   [x] Fall Precautions  [x] Fluid/Electrolyte/Nutrition Balance   [] Voice Protection   [] Respiratory  [] Other:    Medical Prognosis: [x] Good  [] Fair    [] Guarded   Total expected IRF days 12 days  Anticipated discharge destination:    [] Home Independently   [x] Home Modified Independent  [] Home with supervision    []SNF     [] Other                                           Physician anticipated functional outcomes:  [place] for mobility and ADLs   IPOC brief synthesis: Patient is a 77 yo M with pmh HTN, HLD, DM2, obesity, gout, and lumbar DDD with radiculopathy (s/p prior L3-S1 laminectomy and L4-S1 fusion with Dr. Natan Dyer) who initially presented to [address] [place] [address] on 3/13/2023 for planned spine surgery.  He has

## 2023-03-20 NOTE — PROGRESS NOTES
Patient admitted to rehab with lumbar disc disease with radiculopathy. A/Ox4. Transfers with walker x1. Mobility restrictions: none. On regular; 5 carb diet, tolerating well. Medications taken whole with thins. On heparin for DVT prophylaxis. Skin: scattered bruising; incision to lower back. Oxygen: RA. LDA: none. Has been continent of bowel and continent of bladder. LBM 3/18/23. Chair/bed alarms in use and call light in reach. Will monitor for safety.  Electronically signed by Ranjit Elder RN on 3/20/2023 at 1:06 PM

## 2023-03-20 NOTE — PLAN OF CARE
Problem: Safety - Adult  Goal: Free from fall injury  Outcome: Progressing   Pt has been free from falls this shift, bed alarm on, bed in lowest position, 2/4 side rails up, nonskid socks on, wheels locked, bedside table and call light in reach. Encouraged pt to call out if needed anything. Problem: Pain  Goal: Verbalizes/displays adequate comfort level or baseline comfort level  Outcome: Progressing   Pt c/o right hip pain, medicated per mar with prn oxycodone. Pt verbalized relief and is now resting in bed. Will continue to assess pain level.

## 2023-03-21 LAB
GLUCOSE BLD-MCNC: 114 MG/DL (ref 70–99)
GLUCOSE BLD-MCNC: 124 MG/DL (ref 70–99)
GLUCOSE BLD-MCNC: 154 MG/DL (ref 70–99)
GLUCOSE BLD-MCNC: 163 MG/DL (ref 70–99)
PERFORMED ON: ABNORMAL

## 2023-03-21 PROCEDURE — 1280000000 HC REHAB R&B

## 2023-03-21 PROCEDURE — 6370000000 HC RX 637 (ALT 250 FOR IP): Performed by: PHYSICAL MEDICINE & REHABILITATION

## 2023-03-21 PROCEDURE — 97110 THERAPEUTIC EXERCISES: CPT

## 2023-03-21 PROCEDURE — 97110 THERAPEUTIC EXERCISES: CPT | Performed by: PHYSICAL THERAPIST

## 2023-03-21 PROCEDURE — 97530 THERAPEUTIC ACTIVITIES: CPT | Performed by: PHYSICAL THERAPIST

## 2023-03-21 PROCEDURE — 97116 GAIT TRAINING THERAPY: CPT | Performed by: PHYSICAL THERAPIST

## 2023-03-21 PROCEDURE — 97530 THERAPEUTIC ACTIVITIES: CPT

## 2023-03-21 PROCEDURE — 97535 SELF CARE MNGMENT TRAINING: CPT

## 2023-03-21 PROCEDURE — 51798 US URINE CAPACITY MEASURE: CPT

## 2023-03-21 PROCEDURE — 6360000002 HC RX W HCPCS: Performed by: PHYSICAL MEDICINE & REHABILITATION

## 2023-03-21 PROCEDURE — 94760 N-INVAS EAR/PLS OXIMETRY 1: CPT

## 2023-03-21 RX ADMIN — LISINOPRIL 40 MG: 40 TABLET ORAL at 08:10

## 2023-03-21 RX ADMIN — METFORMIN HYDROCHLORIDE 500 MG: 500 TABLET ORAL at 16:41

## 2023-03-21 RX ADMIN — HEPARIN SODIUM 5000 UNITS: 5000 INJECTION INTRAVENOUS; SUBCUTANEOUS at 20:38

## 2023-03-21 RX ADMIN — ALLOPURINOL 300 MG: 300 TABLET ORAL at 08:10

## 2023-03-21 RX ADMIN — SENNOSIDES AND DOCUSATE SODIUM 1 TABLET: 50; 8.6 TABLET ORAL at 08:10

## 2023-03-21 RX ADMIN — METHOCARBAMOL TABLETS 750 MG: 750 TABLET, COATED ORAL at 20:38

## 2023-03-21 RX ADMIN — HEPARIN SODIUM 5000 UNITS: 5000 INJECTION INTRAVENOUS; SUBCUTANEOUS at 08:10

## 2023-03-21 RX ADMIN — AMLODIPINE BESYLATE 10 MG: 10 TABLET ORAL at 08:10

## 2023-03-21 RX ADMIN — METHOCARBAMOL TABLETS 750 MG: 750 TABLET, COATED ORAL at 12:40

## 2023-03-21 RX ADMIN — TAMSULOSIN HYDROCHLORIDE 0.8 MG: 0.4 CAPSULE ORAL at 08:10

## 2023-03-21 RX ADMIN — METFORMIN HYDROCHLORIDE 500 MG: 500 TABLET ORAL at 08:10

## 2023-03-21 RX ADMIN — OXYCODONE 5 MG: 5 TABLET ORAL at 20:37

## 2023-03-21 RX ADMIN — OXYCODONE 5 MG: 5 TABLET ORAL at 04:25

## 2023-03-21 RX ADMIN — ATENOLOL 50 MG: 50 TABLET ORAL at 08:10

## 2023-03-21 RX ADMIN — METHOCARBAMOL TABLETS 750 MG: 750 TABLET, COATED ORAL at 16:41

## 2023-03-21 RX ADMIN — METHOCARBAMOL TABLETS 750 MG: 750 TABLET, COATED ORAL at 08:10

## 2023-03-21 RX ADMIN — ROSUVASTATIN CALCIUM 5 MG: 10 TABLET, FILM COATED ORAL at 20:38

## 2023-03-21 ASSESSMENT — PAIN - FUNCTIONAL ASSESSMENT: PAIN_FUNCTIONAL_ASSESSMENT: PREVENTS OR INTERFERES WITH MANY ACTIVE NOT PASSIVE ACTIVITIES

## 2023-03-21 ASSESSMENT — PAIN SCALES - GENERAL
PAINLEVEL_OUTOF10: 3
PAINLEVEL_OUTOF10: 3
PAINLEVEL_OUTOF10: 6
PAINLEVEL_OUTOF10: 5
PAINLEVEL_OUTOF10: 0
PAINLEVEL_OUTOF10: 5

## 2023-03-21 ASSESSMENT — PAIN SCALES - WONG BAKER
WONGBAKER_NUMERICALRESPONSE: 6
WONGBAKER_NUMERICALRESPONSE: 6
WONGBAKER_NUMERICALRESPONSE: 0

## 2023-03-21 ASSESSMENT — PAIN DESCRIPTION - ORIENTATION
ORIENTATION: LOWER
ORIENTATION: RIGHT;LEFT
ORIENTATION: LOWER

## 2023-03-21 ASSESSMENT — PAIN DESCRIPTION - LOCATION
LOCATION: BACK
LOCATION: HIP
LOCATION: BACK

## 2023-03-21 ASSESSMENT — PAIN DESCRIPTION - DESCRIPTORS
DESCRIPTORS: ACHING;DISCOMFORT;SORE
DESCRIPTORS: ACHING;DISCOMFORT
DESCRIPTORS: ACHING

## 2023-03-21 NOTE — FLOWSHEET NOTE
Patient admitted to ARU on 3/19/23 with Dx of lumbar disc disease with radiculopathy. Had TLIF on L2 to L4 at [facility] on 3/13/23. A/Ox4. Transfers with walker x1. Mobility restrictions: no twisting, no bending. He is diabetic, on regular; 5 carb diet, tolerating well. Medications taken whole with thins. On heparin sc for DVT prophylaxis. Skin: scattered bruising; 2 areas of post surgical incision running vertically parallel on lower back. Oxygen: RA. LDA: none. Has been continent of bowel and continent of bladder. LBM 3/20/23. Patient gets robaxin routine and prn oxycodone for pain on his right hip. Chair/bed alarms in use and call light in reach. Continue to monitor for safety and comfort.

## 2023-03-21 NOTE — PLAN OF CARE
Problem: Discharge Planning  Goal: Discharge to home or other facility with appropriate resources  3/21/2023 1010 by Cierra Henry RN  Outcome: Cheryle Fitz (Taken 3/20/2023 2305 by Duane Aden, RN)  Discharge to home or other facility with appropriate resources: Identify barriers to discharge with patient and caregiver     Problem: Skin/Tissue Integrity  Goal: Absence of new skin breakdown  Description: 1. Monitor for areas of redness and/or skin breakdown  2. Assess vascular access sites hourly  3. Every 4-6 hours minimum:  Change oxygen saturation probe site  4. Every 4-6 hours:  If on nasal continuous positive airway pressure, respiratory therapy assess nares and determine need for appliance change or resting period.   3/21/2023 1010 by Cierra Henry RN  Outcome: Progressing     Problem: Safety - Adult  Goal: Free from fall injury  3/21/2023 1010 by Cierra Henry RN  Outcome: Progressing  Flowsheets (Taken 3/21/2023 0949)  Free From Fall Injury: Instruct family/caregiver on patient safety     Problem: ABCDS Injury Assessment  Goal: Absence of physical injury  3/21/2023 1010 by Cierra Henry RN  Outcome: Progressing  Flowsheets (Taken 3/21/2023 0949)  Absence of Physical Injury: Implement safety measures based on patient assessment     Problem: Genitourinary - Adult  Goal: Absence of urinary retention  3/21/2023 1010 by Cierra Henry RN  Outcome: Progressing  Flowsheets  Taken 3/21/2023 1010 by Cierra Henry RN  Absence of urinary retention: Assess patients ability to void and empty bladder  Taken 3/21/2023 0814 by Cierra Henry RN  Absence of urinary retention: Assess patients ability to void and empty bladder  Taken 3/20/2023 2305 by Duane Aden, RN  Absence of urinary retention: Assess patients ability to void and empty bladder

## 2023-03-21 NOTE — PROGRESS NOTES
POST FALL MANAGEMENT    [address] [place] [address] RECORD NUMBER:  [id number]  AGE: 78 y.o. GENDER: male  : 1945  TODAYS DATE:  3/21/2023    Details     Fall Occurred: Yes    Was the Fall Witnessed:  Yes PCA in room with patient    Brief Review of Event:Patient in bathroom with PCA leaving toilet and walking to  sink to brush teeth and wash hands. Patient right leg buckled prior to reaching the chair and patient was lowered to the floor by PCA. Patient did not hit his head and denies any injury or discomfort. Date Fall Occurred:  2023  . Time Fall Occurred: 9:00a.m. Assessment     Post Fall Head to Toe Assessment Completed: Yes    Post Arthea Glatter and ABCDS Completed: Yes    Injury Occurred:  no    Did the Patient Experience:( Kwan all that apply)    [] Loss of consciousness  [] Change in mental status following the fall  [] Injuries exceeding minor hematoma and lacerations (restrictions in mobility, joint             range of motion or change in weight bearing status)  [x] Patient is on an anticoagulant medication  [] Unknown if patient hit head    *If any of the boxes are checked, obtain a Head CT w/o Contrast & increase vital signs and neuro checks to q2h.        CT Performed:  No: Witnessed fall and patient did not hit his head  Increased VS/Neuro checks to q2h:  No:     Follow-up     Persons Notified of Fall:  Lakisha Ly all that apply and provide names of persons notified)   [x] Physician: Dr. Lila Matta  [] NP:  [x] Nursing Supervisior: Daya Melgoza  [x] Kevin Francisco  [] Family:  [] Other:      Electronically signed by Donnie Donald RN, [address] [place] [address] 3/21/2023 at 9:38 AM

## 2023-03-21 NOTE — PROGRESS NOTES
Patient admitted to rehab with lumbar disc disease with radiculopathy. A/Ox4. Transfers with no device, ambulates with RW. Mobility restrictions: WBAT. On regular 5 carb  diet, tolerating well. Medications taken whole. On heparin for DVT prophylaxis. Skin: surgical incisions to back intact. Oxygen: room air. LDA: none. Has been continent of bowel and  bladder. LBM 3/21. Chair/bed alarms in use and call light in reach. Will monitor for safety.  Electronically signed by Sudheer Rodney RN, [address] [place] [address] on 3/21/23 at 6:17 PM EDT

## 2023-03-21 NOTE — PROGRESS NOTES
Occupational Therapy  Facility/Department: [facility]  Rehabilitation Occupational Therapy Daily Treatment Note    Date: 3/21/23  Patient Name: Mikie Duenas       [address]  MRN: [id number]  Account: [de-identified]   : 1945  (71 y.o.) Gender: male                    Past Medical History:  has a past medical history of BPH (benign prostatic hyperplasia), Cervical stenosis of spinal canal, DM2 (diabetes mellitus, type 2) ([facility] [address] Essential hypertension, Gout, Hyperlipidemia, and Lumbar stenosis. Past Surgical History:   has a past surgical history that includes Lumbar spine surgery and Lumbar spine surgery (2023). Restrictions  Restrictions/Precautions: Fall Risk, Surgical Protocols  Spinal Precautions: Limited to lifting 5 lbs  Other position/activity restrictions: sheikh just removed at beginning of session. Equipment Used: Wheelchair, Bed    Subjective  Subjective: pt met in dept, agreeable to OT  Restrictions/Precautions: Fall Risk;Surgical Protocols             Objective     Cognition  Overall Cognitive Status: WFL  Orientation  Overall Orientation Status: Within Functional Limits         ADL  Putting On/Taking Off Footwear  Skilled Clinical Factors: able to remove left slipper sock by crossing leg. Donned sock and shoe by crossing leg and using long shoe horn.  right slipper sock off by using long shoe horn, on with sock aid after cues, shoe with long shoe horn. Placed velcro on pts reacher to attach to his walker.   Pt owns reach, loaned sock aid and long shoe horn              OT Exercises  Resistive Exercises: 3 lb dumbbell to improve activity tolerance for ADL, mobiltiy - 15 rep shoulder flex/ext,  horizontal abd/add, elbow flex/ext, sup/pron, wrist flex/ext,       PM session  Pt met in dept, after PT, agreeable to OT  Pt now with KI in place right leg to decrease knee buckling  Pt stood from wheelchair, amb to bathroom, transferred to comfort height commode with toilet training;Self-Care / ADL; Home management training    Goals  Patient Goals   Patient goals : \"I want to be able to walk around, get to the bathroom, do most of my bathing and dressing  -- my wife can do my socks\"  Short Term Goals  Time Frame for Short Term Goals: 1 week pt will. .  Short Term Goal 1: bathe with min assist and AD  Short Term Goal 2: dress UB with set up, LB with min assist and AD  Short Term Goal 3: toilet with min assist and grab bars  Short Term Goal 4: transfers and functional mobility with CGA and AD  Short Term Goal 5: increase activity tolerance to stand 3 min for ADL/IADL tasks  Long Term Goals  Time Frame for Long Term Goals : 10-14 days pt will. .  Long Term Goal 1: bathe indep with AD  Long Term Goal 2: dress UB and LB indep with AD as needed  Long Term Goal 3: toilet indep with grab bar for safety  Long Term Goal 4: transfers and functional mobiltiy indep with AD, simple meal prep indep  Long Term Goal 5: assess for DME                   Therapy Time   Individual Concurrent Group Co-treatment   Time In 1030         Time Out 1115         Minutes 45         Timed Code Treatment Minutes: 45 Minutes   Therapy Time     Individual Co-treatment   Time In 1400 [facility]     Time Out [place]     Minutes 939 Kimber [address], OTR/L 770

## 2023-03-21 NOTE — PROGRESS NOTES
assist;Minimal assistance  Skilled Clinical Factors: for safety and assist eccentric control  Bed To/From Chair  Technique: Stand step  Assistance Level: Contact guard assist  Skilled Clinical Factors: w/c <> mat table w/ RW. VC needed for walker management and handplacement. R knee feels unsteady    Environmental Mobility  Ambulation  Surface: Level surface  Device: Rolling walker  Distance: 10' x 2  Activity: Within Unit  Additional Factors: Set-up; Verbal cues; Increased time to complete  Assistance Level: Contact guard assist  Gait Deviations: Slow halley;Decreased step length bilateral;Unsteady gait; Decreased heel strike right;Decreased heel strike left  Skilled Clinical Factors: CGA x 1 for safety and VC for walker management. R knee feels unsteady and has episodes of slight buckling with weight shifting on the RLE    PT Exercises  Exercise Treatment: Supine: Bridges x 4, SLR on LLE x 4, A/AROM heel slides x 10. A/AROM hamstring stretch B x 4 for 5-10 sec  A/AROM Exercises: Unable to tolerate SAQ on RLE d/t weakness    PM SESSION  Pt in room, sitting in w/c with knee brace donned on RLE. Pt agreeable to PT session. Pt taken to therapy gym to adjust the R knee brace, practice ambulation and exercises. Ambulation  Surface: Level surface  Device: Rolling walker  Distance: 36' + 54' w/ 2 turns  Activity: Within Unit  Additional Factors: Set-up; Verbal cues; Increased time to complete  Assistance Level: Contact guard assist  Gait Deviations: Slow halley;Decreased step length bilateral;Unsteady gait; Decreased heel strike right;Decreased heel strike left  Skilled Clinical Factors: CGA x 1 for safety and VC for walker management. R knee feels more stable amb in knee brace but still with decreased stance time on RLE    R knee brace needed adjusting x 2 throughout session for proper fit. Pt performed standing exercises in // bars B x 10: weight shifting R/L, heel/toe raises, hip ABD and extension.  Pt fatigued in R hip following single leg stance on RLE with exercises. ASSESSMENT/PROGRESS TOWARDS GOALS  Assessment  Assessment: Patient is a 77 yo M with pmh HTN, HLD, DM2, obesity, gout, and lumbar DDD with radiculopathy (s/p prior L3-S1 laminectomy and L4-S1 fusion) presents to ARU below baseline function s/p L2-4 lumbar fusion on 3/13/23. Pt PLOF is Indep for functional mobility and ambulation without assistive device. Currently, pt is SBA-Min A x 1 for bed mobility and transfers, CGA x 1 for ambulating 2 x 10' w/ knee immobilizer and RW. Pt does have R knee buckle with fatigue but improved stability with R knee immobilizing brace. Patient requires cueing for hand placement for sit<>stand transfers and using RW and needs assist for maintaining spinal precautions. Pt would benefit from short stay at ARU to increase strength, balance, endurance, functional mobility, and decrease risk of falls in order to return home safely.   Activity Tolerance: Patient limited by fatigue;Patient limited by endurance  Discharge Recommendations: Continue to assess pending progress;Home with Home health PT;Patient would benefit from continued therapy after discharge  PT Equipment Recommendations  Equipment Needed: Yes  Walker: Rolling    Goals  Patient Goals   Patient Goals : \"to return home\"  Short Term Goals  Time Frame for Short Term Goals: 1 week  Short Term Goal 1: Amb 150' w/ RW at supervision  Short Term Goal 2: Navigate curb step w/ RW at SBA  Short Term Goal 3: Bed mobility at MOD I  Short Term Goal 4: Transfers w/ RW at SBA  Short Term Goal 5: evaluate stairs  Long Term Goals  Time Frame for Long Term Goals : STG = LTG    PLAN OF CARE/SAFETY  Physcial Therapy Plan  General Plan:  minutes of therapy at least 5 out of 7 days a week  Days Per Week: 5 Days  Hours Per Day: 1.5 hours  Therapy Duration: 1 Week  Current Treatment Recommendations: Strengthening;ROM;Balance training;Functional mobility training;Transfer

## 2023-03-21 NOTE — PLAN OF CARE
Problem: Discharge Planning  Goal: Discharge to home or other facility with appropriate resources  3/20/2023 2254 by Mundo Salguero RN  Outcome: Progressing  3/20/2023 1304 by Audrey Mohr RN  Outcome: Progressing  Flowsheets (Taken 3/20/2023 2374)  Discharge to home or other facility with appropriate resources:   Identify barriers to discharge with patient and caregiver   Arrange for needed discharge resources and transportation as appropriate   Identify discharge learning needs (meds, wound care, etc)   Refer to discharge planning if patient needs post-hospital services based on physician order or complex needs related to functional status, cognitive ability or social support system     Problem: Skin/Tissue Integrity  Goal: Absence of new skin breakdown  Description: 1. Monitor for areas of redness and/or skin breakdown  2. Assess vascular access sites hourly  3. Every 4-6 hours minimum:  Change oxygen saturation probe site  4. Every 4-6 hours:  If on nasal continuous positive airway pressure, respiratory therapy assess nares and determine need for appliance change or resting period.   3/20/2023 2254 by Mundo Salguero RN  Outcome: Progressing  3/20/2023 1304 by Audrey Mohr RN  Outcome: Progressing     Problem: Safety - Adult  Goal: Free from fall injury  3/20/2023 2254 by Mundo Salguero RN  Outcome: Progressing  3/20/2023 1304 by Audrey Mohr RN  Outcome: Progressing  Flowsheets (Taken 3/20/2023 1303)  Free From Fall Injury: Instruct family/caregiver on patient safety     Problem: ABCDS Injury Assessment  Goal: Absence of physical injury  3/20/2023 2254 by Mundo Salguero RN  Outcome: Progressing  3/20/2023 1304 by Audrey Mohr RN  Outcome: Progressing  Flowsheets (Taken 3/20/2023 1303)  Absence of Physical Injury: Implement safety measures based on patient assessment     Problem: Pain  Goal: Verbalizes/displays adequate comfort level or baseline comfort level  3/20/2023 2254 by Mundo Salguero RN  Outcome: Progressing  3/20/2023 1304 by Georgie Nolen RN  Outcome: Progressing  Flowsheets (Taken 3/20/2023 6876)  Verbalizes/displays adequate comfort level or baseline comfort level:   Encourage patient to monitor pain and request assistance   Assess pain using appropriate pain scale     Problem: Neurosensory - Adult  Goal: Achieves stable or improved neurological status  Outcome: Progressing  Goal: Absence of seizures  Outcome: Progressing  Goal: Remains free of injury related to seizures activity  Outcome: Progressing  Goal: Achieves maximal functionality and self care  Outcome: Progressing     Problem: Respiratory - Adult  Goal: Achieves optimal ventilation and oxygenation  Outcome: Progressing     Problem: Cardiovascular - Adult  Goal: Maintains optimal cardiac output and hemodynamic stability  Outcome: Progressing  Goal: Absence of cardiac dysrhythmias or at baseline  Outcome: Progressing     Problem: Skin/Tissue Integrity - Adult  Goal: Skin integrity remains intact  Outcome: Progressing  Flowsheets (Taken 3/20/2023 1303 by Georgie Nolen RN)  Skin Integrity Remains Intact: Monitor for areas of redness and/or skin breakdown  Goal: Incisions, wounds, or drain sites healing without S/S of infection  Outcome: Progressing  Goal: Oral mucous membranes remain intact  Outcome: Progressing     Problem: Musculoskeletal - Adult  Goal: Return mobility to safest level of function  Outcome: Progressing  Goal: Maintain proper alignment of affected body part  Outcome: Progressing  Goal: Return ADL status to a safe level of function  Outcome: Progressing     Problem: Gastrointestinal - Adult  Goal: Minimal or absence of nausea and vomiting  Outcome: Progressing  Goal: Maintains or returns to baseline bowel function  Outcome: Progressing  Goal: Maintains adequate nutritional intake  Outcome: Progressing     Problem: Genitourinary - Adult  Goal: Absence of urinary retention  Outcome: Progressing     Problem: Infection

## 2023-03-21 NOTE — PROGRESS NOTES
shifting on the RLE  Stairs:     Curb: Wheelchair:     Assessment:  Assessment: Patient is a 77 yo M with pmh HTN, HLD, DM2, obesity, gout, and lumbar DDD with radiculopathy (s/p prior L3-S1 laminectomy and L4-S1 fusion) presents to ARU below baseline function s/p L2-4 lumbar fusion on 3/13/23. Pt PLOF is Indep for functional mobility and ambulation without assistive device. Currently, pt is SBA-Min A x 1 for bed mobility and transfers, CGA x 1 for ambulating 2 x 10' w/ RW, and Min-Mod x 2 for curb step navigation w/ RW. Pt does have R knee buckle with fatigue and requires Max for controlled descent to seated surface. Patient requires cueing for hand placement for sit<>stand transfers and using RW and needs assist for maintaining spinal precautions. Pt would benefit from short stay at ARU to increase strength, balance, endurance, functional mobility, and decrease risk of falls in order to return home safely. Activity Tolerance: Patient limited by fatigue, Patient limited by endurance  Discharge Recommendations: Continue to assess pending progress, Home with Home health PT, Patient would benefit from continued therapy after discharge      Occupational therapy:   Feeding     Grooming/Oral Hygiene     UE Bathing     LE Bathing     UE Dressing     LE Dressing     Putting On/Taking Off Footwear  Skilled Clinical Factors: able to remove left slipper sock by crossing leg. Donned sock and shoe by crossing leg and using long shoe horn.  right slipper sock off by using long shoe horn, on with sock aid after cues, shoe with long shoe horn. Placed velcro on pts reacher to attach to his walker.   Pt owns reach, loaned sock aid and long shoe horn  Toileting     Assessment:  Activity Tolerance: Patient limited by fatigue, Patient limited by endurance  Discharge Recommendations: Home with Home health OT, Home with assist PRN    Speech therapy:            PT  Position Activity Restriction  Spinal Precautions: No Bending, No Working on functional mobility, balance, strength, compensatory strategies. Barriers include: weakness, spine precautions, urinary retention. Anticipated Dispo: home  with spouse  Services: TBD  DME: TBD  ELOS: 10-14 days      John Bassett MD 3/21/2023, 12:29 PM

## 2023-03-22 LAB
GLUCOSE BLD-MCNC: 111 MG/DL (ref 70–99)
GLUCOSE BLD-MCNC: 126 MG/DL (ref 70–99)
GLUCOSE BLD-MCNC: 137 MG/DL (ref 70–99)
GLUCOSE BLD-MCNC: 148 MG/DL (ref 70–99)
PERFORMED ON: ABNORMAL

## 2023-03-22 PROCEDURE — 97530 THERAPEUTIC ACTIVITIES: CPT

## 2023-03-22 PROCEDURE — 94760 N-INVAS EAR/PLS OXIMETRY 1: CPT

## 2023-03-22 PROCEDURE — 6360000002 HC RX W HCPCS: Performed by: PHYSICAL MEDICINE & REHABILITATION

## 2023-03-22 PROCEDURE — 6370000000 HC RX 637 (ALT 250 FOR IP): Performed by: PHYSICAL MEDICINE & REHABILITATION

## 2023-03-22 PROCEDURE — 97110 THERAPEUTIC EXERCISES: CPT | Performed by: PHYSICAL THERAPIST

## 2023-03-22 PROCEDURE — 97116 GAIT TRAINING THERAPY: CPT | Performed by: PHYSICAL THERAPIST

## 2023-03-22 PROCEDURE — 97535 SELF CARE MNGMENT TRAINING: CPT

## 2023-03-22 PROCEDURE — 97530 THERAPEUTIC ACTIVITIES: CPT | Performed by: PHYSICAL THERAPIST

## 2023-03-22 PROCEDURE — 1280000000 HC REHAB R&B

## 2023-03-22 PROCEDURE — 97110 THERAPEUTIC EXERCISES: CPT

## 2023-03-22 RX ORDER — SENNA AND DOCUSATE SODIUM 50; 8.6 MG/1; MG/1
1 TABLET, FILM COATED ORAL EVERY OTHER DAY
Status: DISCONTINUED | OUTPATIENT
Start: 2023-03-23 | End: 2023-03-31 | Stop reason: HOSPADM

## 2023-03-22 RX ADMIN — METFORMIN HYDROCHLORIDE 500 MG: 500 TABLET ORAL at 08:28

## 2023-03-22 RX ADMIN — ALLOPURINOL 300 MG: 300 TABLET ORAL at 08:28

## 2023-03-22 RX ADMIN — OXYCODONE 5 MG: 5 TABLET ORAL at 23:23

## 2023-03-22 RX ADMIN — ROSUVASTATIN CALCIUM 5 MG: 10 TABLET, FILM COATED ORAL at 20:10

## 2023-03-22 RX ADMIN — METHOCARBAMOL TABLETS 750 MG: 750 TABLET, COATED ORAL at 08:28

## 2023-03-22 RX ADMIN — OXYCODONE 5 MG: 5 TABLET ORAL at 01:03

## 2023-03-22 RX ADMIN — Medication 3 MG: at 20:11

## 2023-03-22 RX ADMIN — HEPARIN SODIUM 5000 UNITS: 5000 INJECTION INTRAVENOUS; SUBCUTANEOUS at 08:29

## 2023-03-22 RX ADMIN — TAMSULOSIN HYDROCHLORIDE 0.8 MG: 0.4 CAPSULE ORAL at 08:28

## 2023-03-22 RX ADMIN — METHOCARBAMOL TABLETS 750 MG: 750 TABLET, COATED ORAL at 12:47

## 2023-03-22 RX ADMIN — HEPARIN SODIUM 5000 UNITS: 5000 INJECTION INTRAVENOUS; SUBCUTANEOUS at 21:53

## 2023-03-22 RX ADMIN — ATENOLOL 50 MG: 50 TABLET ORAL at 08:28

## 2023-03-22 RX ADMIN — METFORMIN HYDROCHLORIDE 500 MG: 500 TABLET ORAL at 16:39

## 2023-03-22 RX ADMIN — METHOCARBAMOL TABLETS 750 MG: 750 TABLET, COATED ORAL at 20:11

## 2023-03-22 RX ADMIN — METHOCARBAMOL TABLETS 750 MG: 750 TABLET, COATED ORAL at 16:39

## 2023-03-22 ASSESSMENT — PAIN DESCRIPTION - ORIENTATION
ORIENTATION: RIGHT;LEFT
ORIENTATION: RIGHT;LEFT
ORIENTATION: LOWER

## 2023-03-22 ASSESSMENT — PAIN DESCRIPTION - LOCATION
LOCATION: HIP;BUTTOCKS;LEG
LOCATION: BACK
LOCATION: HIP

## 2023-03-22 ASSESSMENT — PAIN DESCRIPTION - DESCRIPTORS
DESCRIPTORS: ACHING
DESCRIPTORS: ACHING;DISCOMFORT;SORE
DESCRIPTORS: ACHING

## 2023-03-22 ASSESSMENT — PAIN SCALES - GENERAL
PAINLEVEL_OUTOF10: 0
PAINLEVEL_OUTOF10: 5
PAINLEVEL_OUTOF10: 6
PAINLEVEL_OUTOF10: 6
PAINLEVEL_OUTOF10: 0

## 2023-03-22 ASSESSMENT — PAIN - FUNCTIONAL ASSESSMENT
PAIN_FUNCTIONAL_ASSESSMENT: PREVENTS OR INTERFERES WITH MANY ACTIVE NOT PASSIVE ACTIVITIES
PAIN_FUNCTIONAL_ASSESSMENT: PREVENTS OR INTERFERES SOME ACTIVE ACTIVITIES AND ADLS

## 2023-03-22 NOTE — PROGRESS NOTES
Occupational Therapy  Facility/Department: [facility]  Rehabilitation Occupational Therapy Daily Treatment Note    Date: 3/22/23  Patient Name: Shayy An       Room: O1K-7956/3693-19  MRN: [id number]  Account: [de-identified]   : 1945  (71 y.o.) Gender: male                    Past Medical History:  has a past medical history of BPH (benign prostatic hyperplasia), Cervical stenosis of spinal canal, DM[facility]di[facility], type 2) [facility] Essential hypertension, Gout, Hyperlipidemia, and Lumbar stenosis. Past Surgical History:   has a past surgical history that includes Lumbar spine surgery and Lumbar spine surgery (2023). Restrictions  Restrictions/Precautions: Fall Risk, Surgical Protocols  Spinal Precautions: Limited to lifting 5 lbs  Other position/activity restrictions: sheikh just removed at beginning of session.   Equipment Used: Wheelchair, Bed    Subjective  Subjective: pt met bedside, resting in recliner, agreeable to OT, shower  Restrictions/Precautions: Fall Risk;Surgical Protocols             Objective     Cognition  Overall Cognitive Status: WFL  Orientation  Overall Orientation Status: Within Functional Limits         ADL  Grooming/Oral Hygiene  Assistance Level: Modified independent  Skilled Clinical Factors: seated in wheelchair for oral care, hair, shaved in shower  Upper Extremity Bathing  Assistance Level: Modified independent  Skilled Clinical Factors: seated on shower chair  Lower Extremity Bathing  Assistance Level: Contact guard assist  Skilled Clinical Factors: long sponge for LE, stood to bathe buttocks with caution, cues to use left hand which is less twisting of back  Upper Extremity Dressing  Assistance Level: Set-up  Skilled Clinical Factors: shirt  Lower Extremity Dressing  Assistance Level: Minimal assistance  Skilled Clinical Factors: able to thread briefs and pants over feet, reacher as needed, min assists for right side of pants over hips due to fatigue, PT after OT    Assessment  Assessment  Assessment: Pt has improved with self care - able to bathe with CGA seated on shower chair, grab bars. Dressed UB with set up, LB with min assist.  Toileting required SBA for urination. Transfers required CGA/min assist from wheelchair, using grab bar. Did not amb today during ADL session,but he is able to amb with KI right knee for support as he has uncontrolled right knee buckling even prior to surgery. Noted increased drainage from left back incision, nursing notified and placed guaze over incision. Pt is very motivated to return home wiht his wife. ANticipate he will require another 7-10 days inpt rehab. He may require toilet safety frames and transfer tub bench if he decides to use his tub vs step into shower stall, he may need a sock aid and long shoe horn. Recommend home OT  Activity Tolerance: Patient tolerated treatment well  Discharge Recommendations: Home with Home health OT; Home with assist PRN  OT Equipment Recommendations  Equipment Needed: Yes  Mobility Devices: ADL Assistive Devices  ADL Assistive Devices: Toilet Safety Frame;Transfer Tub Bench  Other: possibly sock aid, long shoe horn. He owns a reacher  Safety Devices  Type of devices: Gait belt;Call light within reach; Chair alarm in place; Left in chair    Patient Education  Education  Education Given To: Patient  Education Provided: ADL Function;Transfer Training  Education Provided Comments: LB dressing equipment, safety  Education Method: Demonstration;Verbal;Teach Back  Barriers to Learning: None  Education Outcome: Continued education needed;Demonstrated understanding;Verbalized understanding    Plan  Occupational Therapy Plan  Times Per Day: Twice a day  Days Per Week: 5 Days  Hours Per Day: 1.5 hours  Therapy Duration: 2 Weeks (10-14 days)  Current Treatment Recommendations: Strengthening;Balance training;Functional mobility training; Endurance training; Safety education & training;Equipment evaluation, education, & procurement;Patient/Caregiver education & training;Self-Care / ADL; Home management training    Goals  Patient Goals   Patient goals : \"I want to be able to walk around, get to the bathroom, do most of my bathing and dressing  -- my wife can do my socks\"  Short Term Goals  Time Frame for Short Term Goals: 1 week pt will. .  Short Term Goal 1: bathe with min assist and AD  Short Term Goal 2: dress UB with set up, LB with min assist and AD  Short Term Goal 3: toilet with min assist and grab bars  Short Term Goal 4: transfers and functional mobility with CGA and AD  Short Term Goal 5: increase activity tolerance to stand 3 min for ADL/IADL tasks  Long Term Goals  Time Frame for Long Term Goals : 10-14 days pt will. .  Long Term Goal 1: bathe indep with AD  Long Term Goal 2: dress UB and LB indep with AD as needed  Long Term Goal 3: toilet indep with grab bar for safety  Long Term Goal 4: transfers and functional mobiltiy indep with AD, simple meal prep indep  Long Term Goal 5: assess for DME                   Therapy Time   Individual Concurrent Group Co-treatment   Time In 0915         Time Out 1015         Minutes 60         Timed Code Treatment Minutes: 60 Minutes     Therapy Time     Individual Co-treatment   Time In 1300     Time Out 1345     Minutes [address], OTR/L 770

## 2023-03-22 NOTE — PATIENT CARE CONFERENCE
[facility]  Inpatient Rehabilitation  Weekly Team Conference Note      Date: 3/23/2023  Patient Name:  Darrelyn Leventhal    MRN: [id number]  : 1945  Gender:   Physician:   Diagnosis: Lumbar disc disease with radiculopathy [M51.16]    CASE MANAGEMENT  Assessment: Goal is home with wife and agreeable to home care services. PHYSICAL THERAPY    Bed Mobility:     Sit>supine:  Assistance Level: Minimal assistance  Skilled Clinical Factors: Min x 1 to assist BLEs and maintain spinal precautions  Supine>sit:  Assistance Level: Moderate assistance, Minimal assistance  Skilled Clinical Factors:  Mod x 1 assist trunk into long sitting d/t knee immobilizer on RLE and Min x 1 to assist BLEs off EOB and maintain spinal precautions    Transfers:  Surface: Wheelchair, To mat, From mat  Additional Factors: Set-up, Verbal cues, Hand placement cues, Increased time to complete, Without handrails  Device: Walker (RW)  Sit>stand:  Assistance Level: Contact guard assist  Skilled Clinical Factors: for safety and assist trunk at the gait belt, cueing for hand placement  Stand>sit:  Assistance Level: Contact guard assist  Skilled Clinical Factors: for safety and assist eccentric control  Bed<>chair  Technique: Stand step  Assistance Level: Contact guard assist  Skilled Clinical Factors: w/c <> mat table w/ RW. assist for set up and VC for hand placement  Car transfer:  Assistance Level: Contact guard assist  Skilled Clinical Factors: w/c <> car w/ RW    Ambulation:  Surface: Level surface  Device: Rolling walker  Distance: 79' + 48' with 2 turns, seated rest break in between  Activity: Within Unit  Activity Comments: Assistance required to don R knee immobilizing brace prior to ambulation  Additional Factors: Set-up, Verbal cues, Increased time to complete  Assistance Level: Contact guard assist  Gait Deviations: Slow halley, Decreased step length bilateral, Decreased heel strike right, Decreased heel strike Upcoming Week :    [] Family/Caregiver Education  [] Home visit  []Therapeutic Pass   [] Consults:_______    [] Other;_______    Patient Rehab Team Goals for the Upcoming Week:  1. Pt will complete self care with CGA, AD as needed across all disciplines  2. Pt will complete transfers w/ RW at [facility]  3. Team Members Present at Conference:  Physician:Dr. Jackie Lo MD  : Shanel Crandall [place]    Occupational Therapist: Francoise Ly, OTR/L #[id number]  Physical Therapist:  Christos Hood, [address]  Speech Therapist:   ANGEL Hayes RN CRRN  Dietician:  Psychologist:  Other:      I led this team conference and I approve the established interdisciplinary plan of care as documented within the medical record of [facility]. MD: Jaya Spencer.  Jackie Lo MD 3/23/2023, 7:08 PM

## 2023-03-22 NOTE — CARE COORDINATION
Chart Reviewed. Met wit patient and wife to introduce  role, initiate discussion held regarding discharge planning, ACP  and informed them of weekly Team Conferences to review his progress. ACP completed. SOCIAL WORK ASSESSMENT      GOAL:   Goal is to return home with wife. HOME SITUATION:  Pt and wife live in a house with two steps into house without railing. There is an additional one step up into kitchen. Prior to admit, he was totally independent with all personal care needs, active , did most of the cleaning, shopping, laundry ( in basement), and yard work with a tractor. He did participate in bill paying needs. He is retired as is his wife. No pets in the home. Pcp:   [address];  [facility]        PRIOR LEVEL OF FUNCTIONING:       PERSONAL CARE:    totally independent                                                                       DRIVES:yes                                                                     FINANCES:shared                                                                   MEALS:  mostly wife                               GROCERY SHOPS:  independent      DME CURRENTLY AT HOME:  standard walker, shower chair in the shower      [address]:  Was going to out patient PT/OT services at [facility] prior to surgery. PREFERRED HOME CARE:  Informed them of possible post acute services such as home care or out patient services to continue his progress. Provied them a cms star rated list of agencies for their review and provided cms star rating information about the agencies. ADVANCED DIRECTIVES:  ACP completed. TEAM CONFERENCE DAY:   Thursdays. Informed them of weekly Team Conferences where Team will review progress, goals, DME and DC date. This worker will update them weekly and plan for dc needs.     LSW informed patient of preferred  time on date of discharge which is between 10 - 12 noon. LSW informed patient of recommendation for PCP visit within 7 days post discharge. They denied having missed any appointments or needs of daily living in the last 12 months due to lack of transportation.     Jennifer Johnson [facility]     Case Management   [phone]    3/22/2023  4:56 PM

## 2023-03-22 NOTE — PLAN OF CARE
Problem: Discharge Planning  Goal: Discharge to home or other facility with appropriate resources  Outcome: Progressing     Problem: Skin/Tissue Integrity  Goal: Absence of new skin breakdown  Description: 1. Monitor for areas of redness and/or skin breakdown  2. Assess vascular access sites hourly  3. Every 4-6 hours minimum:  Change oxygen saturation probe site  4. Every 4-6 hours:  If on nasal continuous positive airway pressure, respiratory therapy assess nares and determine need for appliance change or resting period.   Outcome: Progressing     Problem: Safety - Adult  Goal: Free from fall injury  Outcome: Progressing     Problem: ABCDS Injury Assessment  Goal: Absence of physical injury  Outcome: Progressing     Problem: Neurosensory - Adult  Goal: Achieves maximal functionality and self care  Recent Flowsheet Documentation  Taken 3/22/2023 0830 by Cheng Childress RN  Achieves maximal functionality and self care: Encourage and assist patient to increase activity and self care with guidance from physical therapy/occupational therapy

## 2023-03-22 NOTE — PROGRESS NOTES
Department of Physical Medicine & Rehabilitation  Progress Note    Patient Identification:  Patricia Chu  [id number]  : 1945  Admit date: 3/19/2023    Chief Complaint: Lumbar disc disease with radiculopathy    Subjective:   No acute events overnight. Patient seen this afternoon sitting up in room. Reports initial constipation improved but had some loose stool last night. He feels possibly related to metformin as he has this side effect intermittently. He requests changing senna+colace to every other day. Labs reviewed. ROS: No f/c, n/v, cp     Objective:  Patient Vitals for the past 24 hrs:   BP Temp Temp src Pulse Resp SpO2 Weight   23 1009 -- -- -- -- -- 94 % --   23 0821 111/70 -- -- 66 -- -- --   23 0455 -- -- -- -- -- -- 228 lb 6.3 oz (103.6 kg)   23 0419 (!) 106/59 98.4 °F (36.9 °C) Oral 55 14 95 % --   23 2032 (!) 144/69 98.1 °F (36.7 °C) Oral 60 16 97 % --   23 1456 111/63 97.5 °F (36.4 °C) Oral 62 16 99 % --     Const: Alert. No distress, pleasant. HEENT: Normocephalic, atraumatic. Normal sclera/conjunctiva. MMM. CV: Regular rate and rhythm. Resp: No respiratory distress. Lungs CTAB. Abd: Soft, nontender, nondistended, NABS+   Ext: No edema. MSK: Decreased spine ROM  Neuro: Alert, oriented, appropriately interactive. Strength 4/5 bilateral hip flexion, 4/5 right knee extension  Psych: Cooperative, appropriate mood and affect    Laboratory data: Available via EMR.    Last 24 hour lab  Recent Results (from the past 24 hour(s))   POCT Glucose    Collection Time: 23  4:11 PM   Result Value Ref Range    POC Glucose 114 (H) 70 - 99 mg/dl    Performed on ACCU-CHEK    POCT Glucose    Collection Time: 23  8:15 PM   Result Value Ref Range    POC Glucose 154 (H) 70 - 99 mg/dl    Performed on ACCU-CHEK    POCT Glucose    Collection Time: 23  6:57 AM   Result Value Ref Range    POC Glucose 126 (H) 70 - 99 mg/dl    Performed on ACCU-CHEK Therapy progress:  Physical therapy:  Bed Mobility:     Sit>supine:  Assistance Level: Minimal assistance  Skilled Clinical Factors: Min x 1 to assist BLEs and maintain spinal precautions  Supine>sit:  Assistance Level: Minimal assistance  Skilled Clinical Factors: No cues for log roll. Min x 1 to assist BLEs and maintain spinal precautions  Transfers:  Surface: Wheelchair, To mat, From mat  Additional Factors: Set-up, Verbal cues, Hand placement cues, Increased time to complete, Without handrails  Device: Walker (RW)  Sit>stand:  Assistance Level: Contact guard assist  Skilled Clinical Factors: for safety and assist trunk at the gait belt, cueing for hand placement  Stand>sit:  Assistance Level: Contact guard assist  Skilled Clinical Factors: for safety and assist eccentric control  Bed<>chair  Technique: Stand step  Assistance Level: Contact guard assist  Skilled Clinical Factors: w/c <> mat table w/ RW. assist for set up and VC for hand placement  Stand Pivot:     Lateral transfer:     Car transfer:  Assistance Level: Contact guard assist  Skilled Clinical Factors: w/c <> car w/ RW. assist for set up and VC for hand placement  Ambulation:  Surface: Level surface  Device: Rolling walker  Distance: 10' x 2  Activity: Within Unit  Additional Factors: Set-up, Verbal cues, Increased time to complete  Assistance Level: Contact guard assist  Gait Deviations: Slow halley, Decreased step length bilateral, Unsteady gait, Decreased heel strike right, Decreased heel strike left  Skilled Clinical Factors: CGA x 1 for safety and VC for walker management.  Decreased stance time on RLE d/t unsteadiness of R knee but no episodes of buckling w/ immobilizing brace  Stairs:     Curb:  Curb Height: 6''  Device: Rolling walker  Number of Curbs: 1  Additional Factors: Set-up, Verbal cues, Hand placement cues, Increased time to complete  Assistance Level: Contact guard assist, Minimal assistance  Wheelchair:

## 2023-03-22 NOTE — PROGRESS NOTES
Patient admitted to rehab with lumbar disc disease with radiculopathy. A/Ox4. Transfers with no device, ambulates with RW. Mobility restrictions: WBAT, no twisting or bending. On regular 5 carb diet, tolerating well. Medications taken whole. On heparin for DVT prophylaxis. Skin: surgical incision to back intact. Oxygen: room air. LDA: none. Has been continent/incontinent of bowel and continent of bladder. LBM 3/22. Chair/bed alarms in use and call light in reach. Will monitor for safety.  Electronically signed by Betsey Kim RN, [address] [place] [address] on 3/22/23 at 1:43 PM EDT  ;

## 2023-03-22 NOTE — PROGRESS NOTES
Physical Therapy  Facility/Department: [address] [facility]HAB  Rehabilitation Physical Therapy Treatment Note    NAME: Jose Carpenter  : 1945 (78 y.o.)  MRN: [id number]  CODE STATUS: Full Code    Date of Service: 3/22/23       Restrictions:  Restrictions/Precautions: Fall Risk, Surgical Protocols  Position Activity Restriction  Spinal Precautions: No Bending, No Lifting, No Twisting  Spinal Precautions: Limited to lifting 5 lbs     SUBJECTIVE  Subjective  Subjective: Patient states that he was a little tired from yesterday; able to shower with OT this morning. Pain: R hip 3/10    OBJECTIVE  Cognition  Overall Cognitive Status: WFL  Orientation  Overall Orientation Status: Within Functional Limits    Functional Mobility  Bridging  Assistance Level: Supervision  Roll Left  Assistance Level: Supervision  Roll Right  Assistance Level: Supervision  Sit to Supine  Assistance Level: Minimal assistance  Skilled Clinical Factors: Min x 1 to assist BLEs and maintain spinal precautions  Supine to Sit  Assistance Level: Moderate assistance;Minimal assistance  Skilled Clinical Factors: Mod x 1 assist trunk into long sitting d/t knee immobilizer on RLE and Min x 1 to assist BLEs off EOB and maintain spinal precautions  Scooting  Assistance Level: Supervision  Balance  Sitting Balance: Stand by assistance  Standing Balance: Contact guard assistance  Transfers  Surface: Wheelchair; To mat;From mat  Additional Factors: Set-up; Verbal cues; Hand placement cues; Increased time to complete; Without handrails  Device: Walker (RW)  Sit to Stand  Assistance Level: Contact guard assist  Skilled Clinical Factors: for safety and assist trunk at the gait belt, cueing for hand placement  Stand to Sit  Assistance Level: Contact guard assist  Skilled Clinical Factors: for safety and assist eccentric control  Bed To/From Chair  Technique: Stand step  Assistance Level: Contact guard assist  Skilled Clinical Factors: w/c <> mat table w/ RW. assist for set up and VC for hand placement  Car Transfer  Assistance Level: Contact guard assist  Skilled Clinical Factors: w/c <> car w/ RW    Environmental Mobility  Ambulation  Surface: Level surface  Device: Rolling walker  Distance: 20' with 2 turns  Activity: Within Unit  Activity Comments: Assistance required to don R knee immobilizing brace prior to ambulation  Additional Factors: Set-up; Verbal cues; Increased time to complete  Assistance Level: Contact guard assist  Gait Deviations: Slow halley;Decreased step length bilateral;Decreased heel strike right;Decreased heel strike left  Skilled Clinical Factors: CGA x 1 for safety and VC for walker management. Decreased stance time on RLE d/t unsteadiness of R knee but no episodes of buckling w/ immobilizing brace  Curb  Curb Height: 6''  Device: Rolling walker  Number of Curbs: 1  Additional Factors: Set-up; Verbal cues; Increased time to complete  Assistance Level: Contact guard assist  Skilled Clinical Factors: VC needed for stepping up with left and descending down with the right on the curb    PT Exercises  Exercise Treatment: Supine: Bridges x 2. A/AROM hamstring stretch B x 3 for 15-30 sec  PROM Exercises: R Hip flexion, ER, and distraction x 2 for 2 mins  A/AROM Exercises: Unable to tolerate more bridges today due to bilateral hip pain    PM Session  Taken to other gym to practice car transfer and curb step. Sit <> stand from w/c to RW CGA x 1 throughout session. Ambulation  Surface: Level surface  Device: Rolling walker  Distance: 79' + 48' with 2 turns, seated rest break in between  Activity: Within Unit  Activity Comments: Assistance required to don R knee immobilizing brace prior to ambulation  Additional Factors: Set-up; Verbal cues; Increased time to complete  Assistance Level: Contact guard assist  Gait Deviations: Slow halley;Decreased step length bilateral;Decreased heel strike right;Decreased heel strike left  Skilled Clinical Factors: CGA x 1 for safety and VC for walker management. Decreased stance time on RLE d/t unsteadiness of R knee but no episodes of buckling w/ immobilizing brace    PT Exercises: seated in recliner - B TULIO wu, hip abduction x 10. Pt has decreased strength in RLE and requires A/AROM through full range. R knee immobilizer brace doffed at end of session. Patient in recliner w/ chair alarm, call light near, and spouse present in room. ASSESSMENT/PROGRESS TOWARDS GOALS  Assessment  Assessment: Patient is a 77 yo M with pmh HTN, HLD, DM2, obesity, gout, and lumbar DDD with radiculopathy (s/p prior L3-S1 laminectomy and L4-S1 fusion) presents to ARU below baseline function s/p L2-4 lumbar fusion on 3/13/23. Pt PLOF is Indep for functional mobility and ambulation without assistive device. Currently, pt is supervision-Mod A x 1 for bed mobility and transfers, CGA x 1 for ambulating 79' + 50' w/ knee immobilizer and RW and for navigating curb step w/ RW. Pt demonstrates unsteadiness of R knee w/ fatigue and increased buckling but has improved stability with R knee immobilizing brace w/ transfers and amb. Patient requires cueing for hand placement for sit<>stand transfers and demonstrates improvement with maintaining spinal precautions w/o VC. Pt would benefit from short stay at ARU to increase strength, balance, endurance, functional mobility, and decrease risk of falls in order to return home safely. Activity Tolerance: Patient limited by fatigue;Patient limited by endurance; Patient limited by pain (Increased bilateral hip pain (R > L))  Discharge Recommendations: Continue to assess pending progress;Home with Home health PT;Patient would benefit from continued therapy after discharge  PT Equipment Recommendations  Equipment Needed: Yes  Walker: Rolling    Goals  Patient Goals   Patient Goals : \"to return home\"  Short Term Goals  Time Frame for Short Term Goals: 1 week  Short Term Goal 1: Amb 150' w/ RW at supervision  Short

## 2023-03-22 NOTE — PROGRESS NOTES
Pt awake and AAO sitting up in bed requesting to use the BR. Pt up from lying to sitting with CGA. Sit to stand with CGA and GB and stand pivot to W/C. Pt stood at toilet with CGA to urinate and then sat to have lg soft BM. Pt was incontinent of some stool in pull-up. Pericare given and pull-up changed with substantial assist. Pt back to bed and needed help getting both legs into bed. Spinal precautions in place. Pt s/p TLIF L2-L4 on 3/13/23 with Dr. Donna Jorgensen. Dressing to lower back with quarter sized bloody drainage noted. Pt is c/o pain to lower back and requesting pain medication. Medicated with Roxicodone 5mg per prn orders. Lungs clear. No sob or cough. On RA. Belly round and soft with active BS> ,Call igh tin reach. Bed alarm on.

## 2023-03-22 NOTE — ACP (ADVANCE CARE PLANNING)
Advance Care Planning     Advance Care Planning Activator (Inpatient)  Conversation Note      Date of ACP Conversation: 3/22/2023     Conversation Conducted with: Pt, WifePatient with Decision Making Capacity    ACP Activator: Doyle Hahn [facility] Decision Maker:         Wife, Cindi Kessler   [phone]    Current Designated Health Care Decision Maker:     Click here to complete Healthcare Decision Makers including section of the Healthcare Decision Maker Relationship (ie \"Primary\")      Care Preferences    Ventilation: \"If you were in your present state of health and suddenly became very ill and were unable to breathe on your own, what would your preference be about the use of a ventilator (breathing machine) if it were available to you? \"      Would the patient desire the use of ventilator (breathing machine)?: yes    \"If your health worsens and it becomes clear that your chance of recovery is unlikely, what would your preference be about the use of a ventilator (breathing machine) if it were available to you? \"     Would the patient desire the use of ventilator (breathing machine)?: No      Resuscitation  \"CPR works best to restart the heart when there is a sudden event, like a heart attack, in someone who is otherwise healthy. Unfortunately, CPR does not typically restart the heart for people who have serious health conditions or who are very sick. \"    \"In the event your heart stopped as a result of an underlying serious health condition, would you want attempts to be made to restart your heart (answer \"yes\" for attempt to resuscitate) or would you prefer a natural death (answer \"no\" for do not attempt to resuscitate)? \" yes       [] Yes   [] No   Educated Patient / Geraldean Sine regarding differences between Advance Directives and portable DNR orders.     Length of ACP Conversation in minutes:  2 minutes    Conversation Outcomes:  ACP discussion completed    Follow-up plan:    [] Schedule follow-up conversation to continue planning  [] Referred individual to Provider for additional questions/concerns   [] Advised patient/agent/surrogate to review completed ACP document and update if needed with changes in condition, patient preferences or care setting    [x] This note routed to one or more involved healthcare providers      Routed to Dr Afua Post.     Dayana Stearns [place]     Case Management   [phone]    3/22/2023  4:58 PM

## 2023-03-23 LAB
ANION GAP SERPL CALCULATED.3IONS-SCNC: 11 MMOL/L (ref 3–16)
BASOPHILS # BLD: 0 K/UL (ref 0–0.2)
BASOPHILS NFR BLD: 0.5 %
BUN SERPL-MCNC: 14 MG/DL (ref 7–20)
CALCIUM SERPL-MCNC: 9.1 MG/DL (ref 8.3–10.6)
CHLORIDE SERPL-SCNC: 101 MMOL/L (ref 99–110)
CO2 SERPL-SCNC: 27 MMOL/L (ref 21–32)
CREAT SERPL-MCNC: 0.6 MG/DL (ref 0.8–1.3)
DEPRECATED RDW RBC AUTO: 15.5 % (ref 12.4–15.4)
EOSINOPHIL # BLD: 0.1 K/UL (ref 0–0.6)
EOSINOPHIL NFR BLD: 1.5 %
GFR SERPLBLD CREATININE-BSD FMLA CKD-EPI: >60 ML/MIN/{1.73_M2}
GLUCOSE BLD-MCNC: 126 MG/DL (ref 70–99)
GLUCOSE BLD-MCNC: 135 MG/DL (ref 70–99)
GLUCOSE BLD-MCNC: 148 MG/DL (ref 70–99)
GLUCOSE BLD-MCNC: 165 MG/DL (ref 70–99)
GLUCOSE SERPL-MCNC: 156 MG/DL (ref 70–99)
HCT VFR BLD AUTO: 34.7 % (ref 40.5–52.5)
HGB BLD-MCNC: 11.9 G/DL (ref 13.5–17.5)
LYMPHOCYTES # BLD: 1.5 K/UL (ref 1–5.1)
LYMPHOCYTES NFR BLD: 22.9 %
MCH RBC QN AUTO: 29 PG (ref 26–34)
MCHC RBC AUTO-ENTMCNC: 34.2 G/DL (ref 31–36)
MCV RBC AUTO: 84.9 FL (ref 80–100)
MONOCYTES # BLD: 0.5 K/UL (ref 0–1.3)
MONOCYTES NFR BLD: 6.7 %
NEUTROPHILS # BLD: 4.6 K/UL (ref 1.7–7.7)
NEUTROPHILS NFR BLD: 68.4 %
PERFORMED ON: ABNORMAL
PLATELET # BLD AUTO: 303 K/UL (ref 135–450)
PMV BLD AUTO: 6.3 FL (ref 5–10.5)
POTASSIUM SERPL-SCNC: 4.6 MMOL/L (ref 3.5–5.1)
RBC # BLD AUTO: 4.08 M/UL (ref 4.2–5.9)
SODIUM SERPL-SCNC: 139 MMOL/L (ref 136–145)
WBC # BLD AUTO: 6.7 K/UL (ref 4–11)

## 2023-03-23 PROCEDURE — 6360000002 HC RX W HCPCS: Performed by: PHYSICAL MEDICINE & REHABILITATION

## 2023-03-23 PROCEDURE — 97110 THERAPEUTIC EXERCISES: CPT

## 2023-03-23 PROCEDURE — 97116 GAIT TRAINING THERAPY: CPT | Performed by: PHYSICAL THERAPIST

## 2023-03-23 PROCEDURE — 6370000000 HC RX 637 (ALT 250 FOR IP): Performed by: PHYSICAL MEDICINE & REHABILITATION

## 2023-03-23 PROCEDURE — 97530 THERAPEUTIC ACTIVITIES: CPT | Performed by: PHYSICAL THERAPIST

## 2023-03-23 PROCEDURE — 97530 THERAPEUTIC ACTIVITIES: CPT

## 2023-03-23 PROCEDURE — 97110 THERAPEUTIC EXERCISES: CPT | Performed by: PHYSICAL THERAPIST

## 2023-03-23 PROCEDURE — 94760 N-INVAS EAR/PLS OXIMETRY 1: CPT

## 2023-03-23 PROCEDURE — 85025 COMPLETE CBC W/AUTO DIFF WBC: CPT

## 2023-03-23 PROCEDURE — 97535 SELF CARE MNGMENT TRAINING: CPT

## 2023-03-23 PROCEDURE — 1280000000 HC REHAB R&B

## 2023-03-23 PROCEDURE — 80048 BASIC METABOLIC PNL TOTAL CA: CPT

## 2023-03-23 PROCEDURE — 36415 COLL VENOUS BLD VENIPUNCTURE: CPT

## 2023-03-23 RX ADMIN — METHOCARBAMOL TABLETS 750 MG: 750 TABLET, COATED ORAL at 12:50

## 2023-03-23 RX ADMIN — HEPARIN SODIUM 5000 UNITS: 5000 INJECTION INTRAVENOUS; SUBCUTANEOUS at 08:08

## 2023-03-23 RX ADMIN — METHOCARBAMOL TABLETS 750 MG: 750 TABLET, COATED ORAL at 08:04

## 2023-03-23 RX ADMIN — SENNOSIDES AND DOCUSATE SODIUM 1 TABLET: 50; 8.6 TABLET ORAL at 08:04

## 2023-03-23 RX ADMIN — METHOCARBAMOL TABLETS 750 MG: 750 TABLET, COATED ORAL at 21:37

## 2023-03-23 RX ADMIN — HEPARIN SODIUM 5000 UNITS: 5000 INJECTION INTRAVENOUS; SUBCUTANEOUS at 21:37

## 2023-03-23 RX ADMIN — METFORMIN HYDROCHLORIDE 500 MG: 500 TABLET ORAL at 17:12

## 2023-03-23 RX ADMIN — OXYCODONE HYDROCHLORIDE 10 MG: 10 TABLET ORAL at 05:47

## 2023-03-23 RX ADMIN — ALLOPURINOL 300 MG: 300 TABLET ORAL at 08:08

## 2023-03-23 RX ADMIN — ACETAMINOPHEN 650 MG: 325 TABLET ORAL at 03:33

## 2023-03-23 RX ADMIN — METFORMIN HYDROCHLORIDE 500 MG: 500 TABLET ORAL at 08:04

## 2023-03-23 RX ADMIN — ROSUVASTATIN CALCIUM 5 MG: 10 TABLET, FILM COATED ORAL at 21:37

## 2023-03-23 RX ADMIN — ATENOLOL 50 MG: 50 TABLET ORAL at 09:33

## 2023-03-23 RX ADMIN — TAMSULOSIN HYDROCHLORIDE 0.8 MG: 0.4 CAPSULE ORAL at 08:04

## 2023-03-23 RX ADMIN — OXYCODONE 5 MG: 5 TABLET ORAL at 22:49

## 2023-03-23 RX ADMIN — METHOCARBAMOL TABLETS 750 MG: 750 TABLET, COATED ORAL at 17:11

## 2023-03-23 ASSESSMENT — PAIN - FUNCTIONAL ASSESSMENT
PAIN_FUNCTIONAL_ASSESSMENT: PREVENTS OR INTERFERES WITH MANY ACTIVE NOT PASSIVE ACTIVITIES
PAIN_FUNCTIONAL_ASSESSMENT: ACTIVITIES ARE NOT PREVENTED
PAIN_FUNCTIONAL_ASSESSMENT: ACTIVITIES ARE NOT PREVENTED
PAIN_FUNCTIONAL_ASSESSMENT: PREVENTS OR INTERFERES SOME ACTIVE ACTIVITIES AND ADLS
PAIN_FUNCTIONAL_ASSESSMENT: PREVENTS OR INTERFERES WITH MANY ACTIVE NOT PASSIVE ACTIVITIES
PAIN_FUNCTIONAL_ASSESSMENT: ACTIVITIES ARE NOT PREVENTED
PAIN_FUNCTIONAL_ASSESSMENT: PREVENTS OR INTERFERES SOME ACTIVE ACTIVITIES AND ADLS

## 2023-03-23 ASSESSMENT — PAIN DESCRIPTION - LOCATION
LOCATION: HIP
LOCATION: HIP
LOCATION: HIP;LEG;KNEE
LOCATION: HIP
LOCATION: LEG;HIP
LOCATION: LEG
LOCATION: HIP

## 2023-03-23 ASSESSMENT — PAIN SCALES - WONG BAKER
WONGBAKER_NUMERICALRESPONSE: 8
WONGBAKER_NUMERICALRESPONSE: 0
WONGBAKER_NUMERICALRESPONSE: 2

## 2023-03-23 ASSESSMENT — PAIN SCALES - GENERAL
PAINLEVEL_OUTOF10: 0
PAINLEVEL_OUTOF10: 1
PAINLEVEL_OUTOF10: 0
PAINLEVEL_OUTOF10: 2
PAINLEVEL_OUTOF10: 7
PAINLEVEL_OUTOF10: 3
PAINLEVEL_OUTOF10: 3
PAINLEVEL_OUTOF10: 0
PAINLEVEL_OUTOF10: 0
PAINLEVEL_OUTOF10: 9
PAINLEVEL_OUTOF10: 0
PAINLEVEL_OUTOF10: 2
PAINLEVEL_OUTOF10: 0
PAINLEVEL_OUTOF10: 0
PAINLEVEL_OUTOF10: 3
PAINLEVEL_OUTOF10: 5
PAINLEVEL_OUTOF10: 3

## 2023-03-23 ASSESSMENT — PAIN DESCRIPTION - DESCRIPTORS
DESCRIPTORS: ACHING

## 2023-03-23 ASSESSMENT — PAIN DESCRIPTION - FREQUENCY
FREQUENCY: CONTINUOUS

## 2023-03-23 ASSESSMENT — PAIN DESCRIPTION - ORIENTATION
ORIENTATION: RIGHT
ORIENTATION: RIGHT
ORIENTATION: RIGHT;LEFT
ORIENTATION: LEFT;RIGHT
ORIENTATION: RIGHT
ORIENTATION: RIGHT
ORIENTATION: RIGHT;LEFT

## 2023-03-23 ASSESSMENT — PAIN DESCRIPTION - PAIN TYPE
TYPE: SURGICAL PAIN

## 2023-03-23 ASSESSMENT — PAIN DESCRIPTION - ONSET
ONSET: ON-GOING

## 2023-03-23 NOTE — PLAN OF CARE
Problem: Discharge Planning  Goal: Discharge to home or other facility with appropriate resources  Outcome: Progressing     Problem: Skin/Tissue Integrity  Goal: Absence of new skin breakdown  Description: 1. Monitor for areas of redness and/or skin breakdown  2. Assess vascular access sites hourly  3. Every 4-6 hours minimum:  Change oxygen saturation probe site  4. Every 4-6 hours:  If on nasal continuous positive airway pressure, respiratory therapy assess nares and determine need for appliance change or resting period.   Outcome: Progressing     Problem: Safety - Adult  Goal: Free from fall injury  Outcome: Progressing     Problem: ABCDS Injury Assessment  Goal: Absence of physical injury  Outcome: Progressing     Problem: Pain  Goal: Verbalizes/displays adequate comfort level or baseline comfort level  Outcome: Progressing     Problem: Neurosensory - Adult  Goal: Achieves stable or improved neurological status  Outcome: Progressing  Goal: Absence of seizures  Outcome: Progressing  Goal: Remains free of injury related to seizures activity  Outcome: Progressing  Goal: Achieves maximal functionality and self care  Outcome: Progressing     Problem: Respiratory - Adult  Goal: Achieves optimal ventilation and oxygenation  Outcome: Progressing     Problem: Cardiovascular - Adult  Goal: Maintains optimal cardiac output and hemodynamic stability  Outcome: Progressing  Goal: Absence of cardiac dysrhythmias or at baseline  Outcome: Progressing     Problem: Skin/Tissue Integrity - Adult  Goal: Skin integrity remains intact  Outcome: Progressing  Goal: Incisions, wounds, or drain sites healing without S/S of infection  Outcome: Progressing  Goal: Oral mucous membranes remain intact  Outcome: Progressing     Problem: Musculoskeletal - Adult  Goal: Return mobility to safest level of function  Outcome: Progressing  Goal: Maintain proper alignment of affected body part  Outcome: Progressing  Goal: Return ADL status to a safe level of function  Outcome: Progressing     Problem: Gastrointestinal - Adult  Goal: Minimal or absence of nausea and vomiting  Outcome: Progressing  Goal: Maintains or returns to baseline bowel function  Outcome: Progressing  Goal: Maintains adequate nutritional intake  Outcome: Progressing     Problem: Genitourinary - Adult  Goal: Absence of urinary retention  Outcome: Progressing     Problem: Infection - Adult  Goal: Absence of infection at discharge  Outcome: Progressing  Goal: Absence of infection during hospitalization  Outcome: Progressing  Goal: Absence of fever/infection during anticipated neutropenic period  Outcome: Progressing     Problem: Metabolic/Fluid and Electrolytes - Adult  Goal: Electrolytes maintained within normal limits  Outcome: Progressing  Goal: Hemodynamic stability and optimal renal function maintained  Outcome: Progressing  Goal: Glucose maintained within prescribed range  Outcome: Progressing     Problem: Hematologic - Adult  Goal: Maintains hematologic stability  Outcome: Progressing

## 2023-03-23 NOTE — PROGRESS NOTES
(L) 4.20 - 5.90 M/uL    Hemoglobin 11.9 (L) 13.5 - 17.5 g/dL    Hematocrit 34.7 (L) 40.5 - 52.5 %    MCV 84.9 80.0 - 100.0 fL    MCH 29.0 26.0 - 34.0 pg    MCHC 34.2 31.0 - 36.0 g/dL    RDW 15.5 (H) 12.4 - 15.4 %    Platelets 907 891 - 823 K/uL    MPV 6.3 5.0 - 10.5 fL    Neutrophils % 68.4 %    Lymphocytes % 22.9 %    Monocytes % 6.7 %    Eosinophils % 1.5 %    Basophils % 0.5 %    Neutrophils Absolute 4.6 1.7 - 7.7 K/uL    Lymphocytes Absolute 1.5 1.0 - 5.1 K/uL    Monocytes Absolute 0.5 0.0 - 1.3 K/uL    Eosinophils Absolute 0.1 0.0 - 0.6 K/uL    Basophils Absolute 0.0 0.0 - 0.2 K/uL   Basic Metabolic Panel w/ Reflex to MG    Collection Time: 03/23/23  8:23 AM   Result Value Ref Range    Sodium 139 136 - 145 mmol/L    Potassium reflex Magnesium 4.6 3.5 - 5.1 mmol/L    Chloride 101 99 - 110 mmol/L    CO2 27 21 - 32 mmol/L    Anion Gap 11 3 - 16    Glucose 156 (H) 70 - 99 mg/dL    BUN 14 7 - 20 mg/dL    Creatinine 0.6 (L) 0.8 - 1.3 mg/dL    Est, Glom Filt Rate >60 >60    Calcium 9.1 8.3 - 10.6 mg/dL   POCT Glucose    Collection Time: 03/23/23 11:30 AM   Result Value Ref Range    POC Glucose 148 (H) 70 - 99 mg/dl    Performed on ACCU-CHEK    POCT Glucose    Collection Time: 03/23/23  4:04 PM   Result Value Ref Range    POC Glucose 126 (H) 70 - 99 mg/dl    Performed on ACCU-CHEK        Therapy progress:  Physical therapy:  Bed Mobility:     Sit>supine:  Assistance Level: Stand by assist  Skilled Clinical Factors: Able to lift BLE onto mat table and maintain spinal precautions  Supine>sit:  Assistance Level: Stand by assist  Skilled Clinical Factors: Able to log roll and slide BLE off EOB w/ immobilizer brace on R; improved ability to push up with BUE to come to full sit on EOB  Transfers:  Surface: Wheelchair, To mat, From mat  Additional Factors: Set-up, Verbal cues, Increased time to complete, Without handrails  Device: Walker (RW)  Sit>stand:  Assistance Level: Contact guard assist  Skilled Clinical Factors: for and using footstool to rest RLE onto, needing Min A overall and will most likely need assist for managing lower straps if wears at home, d/t back precautions. Pt stating wife will be able to assist. Pt standing for toileting and grooming, SBA. Cont poc. Activity Tolerance: Patient limited by endurance, Patient limited by pain, Patient tolerated treatment well (Increased bilateral hip pain (R > L))  Discharge Recommendations: Home with Home health OT, Home with assist PRN    Speech therapy:            PT  Position Activity Restriction  Spinal Precautions: No Bending, No Lifting, No Twisting  Spinal Precautions: Limited to lifting 5 lbs  Other position/activity restrictions: prn knee immobilizer for unstable R knee (using mostly in therapy). Objective     Sit to Stand: Contact guard assistance, Minimal Assistance (RW)  Stand to Sit: Contact guard assistance, Maximum Assistance, Minimal Assistance (RW)  Bed to Chair: Contact guard assistance, Minimal assistance (EOB <> recliner w/ RW)  Device: Rolling Walker  Assistance: Contact guard assistance  Distance: 20' w/ 2 turns within the gym, additional distances within unit for stairs and transfers  OT  PT Equipment Recommendations  Equipment Needed: Yes  Mobility Devices: Benjaman Inch: Rolling     Assessment        SLP          Body mass index is 33.92 kg/m². Rehabilitation Diagnosis:   Orthopedic, 8.9, Other Orthopedic        Assessment and Plan:     Impairments: RLE>LLE weakness, sensory deficit, decreased balance, spine ROM     Lumbar DDD with bilateral radiculopathy  -s/p L2-4 TLIF (3/13 with Dr. Lorelei Rutherford)  -Wound care per [facility]  -PT/OT     Acute blood loss anemia   -Post-surgical.   -Monitor Hgb, transfuse prn <7.       HTN  -amlodipine, atenolol, lisinopril -- medications held last 2 days, may need to decrease regimen     HLD  -rosuvastatin     DM2 with hyperglycemia  -metformin, ISS -- monitor trend, improving     Gout  -allopurinol     Morbid Obesity -Complicating assessment and treatment. Placing patient at risk for multiple co-morbidities as well as early death and contributing to the patient's presentation.   -Dietician consult. Counseling and education. Bladder: Urinary retention, h/o BPH  -Jackson removed for void trial (3/20)   -Monitor PVRs, SC prn >500cc -- Voiding without need for straight cath thus far. -Flomax     Bowel   -High risk constipation   -senna+colace QOD, PRN miralax, MoM, and bisacodyl supp. Safety   -fall precautions     Pain control  -methocarbamol, prn oxycodone     DVT ppx  -Heparin BID per [facility]    Rehab Progress: Interdisciplinary team conference was held today with entire rehab treatment team including PT, OT, SLP (if applicable), Dietician, RN, and SW. Discussion focused on progress toward rehab goals and discharge planning. Making progress. Working on functional mobility, balance, strength, compensatory strategies. Barriers include: R quad weakness, spine precautions, urinary retention. We as a medical team, and I as the physician , made a plan to work on these barriers to facilitate safe discharge. Plan will be presented to patient/family (if available). Anticipated Dispo: home  with spouse  Services: HH PT, OT, RN  DME: wheel kit for walker, TSF, TTB  ELOS: 3/31      Any Huynh.  Sergio Goodwin MD 3/23/2023, 7:08 PM

## 2023-03-23 NOTE — PLAN OF CARE
Problem: Discharge Planning  Goal: Discharge to home or other facility with appropriate resources  3/22/2023 2256 by Pacheco Rosenbaum RN  Outcome: Progressing  3/22/2023 1339 by Una Little RN  Outcome: Progressing     Problem: Skin/Tissue Integrity  Goal: Absence of new skin breakdown  Description: 1. Monitor for areas of redness and/or skin breakdown  2. Assess vascular access sites hourly  3. Every 4-6 hours minimum:  Change oxygen saturation probe site  4. Every 4-6 hours:  If on nasal continuous positive airway pressure, respiratory therapy assess nares and determine need for appliance change or resting period.   3/22/2023 2256 by Pacheco Rosenbaum RN  Outcome: Progressing  3/22/2023 1339 by Una Little RN  Outcome: Progressing     Problem: Safety - Adult  Goal: Free from fall injury  3/22/2023 2256 by Pacheco Rosenbaum RN  Outcome: Progressing  3/22/2023 1339 by Una Little RN  Outcome: Progressing     Problem: ABCDS Injury Assessment  Goal: Absence of physical injury  3/22/2023 2256 by Pacheco Rosenbaum RN  Outcome: Progressing  3/22/2023 1339 by Una Little RN  Outcome: Progressing     Problem: Pain  Goal: Verbalizes/displays adequate comfort level or baseline comfort level  Outcome: Progressing     Problem: Neurosensory - Adult  Goal: Achieves stable or improved neurological status  Outcome: Progressing  Goal: Absence of seizures  Outcome: Progressing  Goal: Remains free of injury related to seizures activity  Outcome: Progressing  Goal: Achieves maximal functionality and self care  Outcome: Progressing     Problem: Respiratory - Adult  Goal: Achieves optimal ventilation and oxygenation  Outcome: Progressing     Problem: Cardiovascular - Adult  Goal: Maintains optimal cardiac output and hemodynamic stability  Outcome: Progressing  Goal: Absence of cardiac dysrhythmias or at baseline  Outcome: Progressing     Problem: Skin/Tissue Integrity - Adult  Goal: Skin integrity remains intact  Outcome: Progressing  Goal: Incisions, wounds, or drain sites healing without S/S of infection  Outcome: Progressing  Goal: Oral mucous membranes remain intact  Outcome: Progressing     Problem: Musculoskeletal - Adult  Goal: Return mobility to safest level of function  Outcome: Progressing  Goal: Maintain proper alignment of affected body part  Outcome: Progressing  Goal: Return ADL status to a safe level of function  Outcome: Progressing     Problem: Gastrointestinal - Adult  Goal: Minimal or absence of nausea and vomiting  Outcome: Progressing  Goal: Maintains or returns to baseline bowel function  Outcome: Progressing  Goal: Maintains adequate nutritional intake  Outcome: Progressing     Problem: Genitourinary - Adult  Goal: Absence of urinary retention  Outcome: Progressing     Problem: Infection - Adult  Goal: Absence of infection at discharge  Outcome: Progressing  Goal: Absence of infection during hospitalization  Outcome: Progressing  Goal: Absence of fever/infection during anticipated neutropenic period  Outcome: Progressing     Problem: Metabolic/Fluid and Electrolytes - Adult  Goal: Electrolytes maintained within normal limits  Outcome: Progressing  Goal: Hemodynamic stability and optimal renal function maintained  Outcome: Progressing  Goal: Glucose maintained within prescribed range  Outcome: Progressing     Problem: Hematologic - Adult  Goal: Maintains hematologic stability  Outcome: Progressing

## 2023-03-23 NOTE — FLOWSHEET NOTE
Patient admitted to ARU on 3/19/23 with Dx of lumbar disc disease with radiculopathy; s/p L2-L4 TLIF at [facility] on 3/13 by Lavell Etienne. A/Ox4. Transfers with no device, ambulates with RW. Mobility restrictions: WBAT, no twisting or bending. On regular 5 carb diet, tolerating well. Medications taken whole. On heparin for DVT prophylaxis. Skin: surgical incision to back intact. Dressing intact. Oxygen: room air. LDA: none. Has been continent/incontinent of bowel and continent of bladder. LBM 3/22. Chair/bed alarms in use and call light in reach. Will monitor for safety.

## 2023-03-23 NOTE — PROGRESS NOTES
Occupational Therapy  Facility/Department: [facility]  Rehabilitation Occupational Therapy Daily Treatment Note    Date: 3/23/23  Patient Name: Travis Aguilar       Room: L2X-0218/1028-96  MRN: [id number]  Account: [de-identified]   : 1945  (71 y.o.) Gender: male          Past Medical History:  has a past medical history of BPH (benign prostatic hyperplasia), Cervical stenosis of spinal canal, DM2 (diabetes mellitus, type 2) ([facility] [address] Essential hypertension, Gout, Hyperlipidemia, and Lumbar stenosis. Past Surgical History:   has a past surgical history that includes Lumbar spine surgery and Lumbar spine surgery (2023). Assessment-AM   Pt tolerated session well. Pt completing functional transfers, short distance mob using RW. Pt needing up to CG/SBA, min cues for safety. Pt completed bed mob, demonstrating using log roll techn, SBA. Pt practiced doff/don of knee immobilizer brace, from EOB and using footstool to rest RLE onto, needing Min A overall and will most likely need assist for managing lower straps if wears at home, d/t back precautions. Pt stating wife will be able to assist. Pt standing for toileting and grooming, SBA. Cont poc.         Social/Functional History  Lives With: Spouse  Type of Home: House  Home Layout: Two level, Laundry in basement, Able to Live on Main level with bedroom/bathroom [facility])  Home Access: Level entry, Stairs to enter without rails  Entrance Stairs - Number of Steps: No steps at front door; 1 step from garage (8-9 stairs w/ B handrails from basement to 1st floor)  Bathroom Shower/Tub: Walk-in shower, Shower chair without back  Bathroom Toilet: Handicap height (17')  Bathroom Accessibility: Accessible  Home Equipment: [place], quad, Walker, standard, Reacher  Has the patient had two or more falls in the past year or any fall with injury in the past year?: No  Receives Help From: Family  ADL Assistance: [address] [place] [address] Avenue: [address] [place] [address] Stand by assist  Transfers  Surface: Wheelchair; To bed;From bed; To chair with arms (to recliner at end of tx)  Additional Factors: Verbal cues; Hand placement cues  Device:  (RW)  Sit to Stand  Assistance Level: Stand by assist;Contact guard assist  Skilled Clinical Factors: Cues for hand placement  Stand to Sit  Assistance Level: Stand by assist;Contact guard assist  Skilled Clinical Factors: cues for aligning to surfaces             Second session: Pt met BS, in w/c. Pt agreeable to OT. Pt reporting 1/10 pain. Pt requesting to use BR. ADL's-Pt SBA for standing to urinate at commode. Pt SBA for standing at sink to wash hands. Pt sat from w/c to doff/don socks, footies (KI off), with SBA, by crossing LE's and no difficulty crossing RLE today to reach R foot. Pt amb in room, and in dept, up to 15-20ft, with CGA with RW, R KI on. Pt standing for ADL's for up to 2min. Pt CG/SBA for commode transfer in dept, practicing with toilet safety frames. Pt needing CGA for practicing TTB transfer, with cues for hand and body placement (keeping R hand on front of walker, L hand on back of TTB). Pt competed BUE ex's with 3lb hand wts for shoulder elbow ex's, to increase strength, endurance for functional tasks. Assessment: Pt tolerated all activity without complaints. Pt improved overall today with standing, amb, with KI in place, RLE. Pt to PT session at end of tx. Activity Tolerance: Patient tolerated treatment well  Discharge Recommendations: Home with Home health OT; Home with assist PRN  OT Equipment Recommendations  ADL Assistive Devices: Toilet Safety Frame;Transfer Tub Bench  Other: possibly sock aid, long shoe horn. He owns a reacher  Safety Devices  Safety Devices in place: Yes  Type of devices: Gait belt;Call light within reach; Chair alarm in place; Left in chair    Patient Education  Education  Education Given To: Patient  Education Provided: Transfer Training;Precautions  Education

## 2023-03-23 NOTE — PROGRESS NOTES
Physical Therapy  Facility/Department: [facility]  Rehabilitation Physical Therapy Treatment Note    NAME: Alex Frazier  : 1945 (78 y.o.)  MRN: [id number]  CODE STATUS: Full Code    Date of Service: 3/23/23       Restrictions:  Restrictions/Precautions: Fall Risk, Surgical Protocols  Position Activity Restriction  Spinal Precautions: No Bending, No Lifting, No Twisting  Spinal Precautions: Limited to lifting 5 lbs  Other position/activity restrictions: prn knee immobilizer for unstable R knee (using mostly in therapy). SUBJECTIVE  Subjective  Subjective: Patient states that he couldn't get too comfortable with Right hip pain last night but is taking pain medication. Pain: R hip 3/10    OBJECTIVE  Cognition  Overall Cognitive Status: WFL  Orientation  Overall Orientation Status: Within Functional Limits    Functional Mobility  Bridging  Assistance Level: Supervision  Skilled Clinical Factors: Able to achieve hip clearance from the mat with full effort; experiences R hip pain during lowering phase  Roll Left  Assistance Level: Supervision  Roll Right  Assistance Level: Supervision  Skilled Clinical Factors: R hip pain decreases on R side  Sit to Supine  Assistance Level: Stand by assist  Skilled Clinical Factors: Able to lift BLE onto mat table and maintain spinal precautions  Supine to Sit  Assistance Level: Stand by assist  Skilled Clinical Factors: Able to log roll and slide BLE off EOB w/ immobilizer brace on R; improved ability to push up with BUE to come to full sit on EOB  Scooting  Assistance Level: Supervision  Balance  Sitting Balance: Stand by assistance  Standing Balance: Contact guard assistance (RW)  Transfers  Surface: Wheelchair; To mat;From mat  Additional Factors: Set-up; Verbal cues; Increased time to complete; Without handrails  Device: Walker (RW)  Sit to Stand  Assistance Level: Contact guard assist  Skilled Clinical Factors: for safety and assist trunk at the gait belt  Stand to Sit  Assistance Level: Contact guard assist  Skilled Clinical Factors: for safety and assist eccentric control  Bed To/From Chair  Technique: Stand pivot  Assistance Level: Contact guard assist  Skilled Clinical Factors: w/c <> mat table w/ hand held assist on LUE, taking short staggered steps to mat table      Environmental Mobility  Ambulation  Surface: Level surface  Device: Rolling walker  Distance: 36' with 2 turns to access curb step  Activity: Within Unit  Activity Comments: Assistance required to don R knee immobilizing brace prior to ambulation  Additional Factors: Set-up; Verbal cues; Increased time to complete  Assistance Level: Contact guard assist  Gait Deviations: Slow halley;Decreased step length bilateral;Decreased heel strike right;Decreased heel strike left  Skilled Clinical Factors: CGA x 1 for safety. Improved stance time on RLE w/ use of knee brace. No unsteadiness or LOB  Curb  Curb Height: 6''  Device: Rolling walker  Number of Curbs: 1  Additional Factors: Increased time to complete  Assistance Level: Contact guard assist  Skilled Clinical Factors: No VC needed for navigating curb step         PT Exercises  Exercise Treatment: Supine: GS and ADD squezze ball for 3 sec x 20 each, heel slides and SAQ w/ bolster 2 x 10 each, Marches 3 x 5 w/ bolster under knees, Bridges x 5. PROM Exercises: R Hip distraction for 1 min x 2    PT Session  Ambulation  Surface: Level surface  Device: Rolling walker  Distance: 80' x 2 with 2 turns down hallway, seated rest break on bench for 3 mins. Additional short distance to // bars in gym  Activity: Within Unit  Activity Comments: Assistance required to don R knee immobilizing brace prior to ambulation  Additional Factors: Set-up; Verbal cues; Increased time to complete  Assistance Level: Contact guard assist  Gait Deviations: Slow halley;Decreased step length bilateral;Decreased heel strike right;Decreased heel strike left  Skilled Clinical Factors: CGA x 1 [address], [facility]  Therapist was present, directed the patient's care, made skilled judgement, and was responsible for assessment and treatment of the patient.         Sonia Johns, 03/23/23 at 2:45 PM    Addie Prather, [address]

## 2023-03-23 NOTE — PROGRESS NOTES
Patient admitted to rehab with lumbar disc disease with radiculopathy. A/Ox4 but forgetful at times. Transfers with walker x1. Mobility restrictions: no twisting or bending. On Regular 5 CCC diet, tolerating well. Medications taken whole with thin liquids. On heparin for DVT prophylaxis. Skin: incision to lower back covered with dry dressing, changed today . Oxygen: RA. LDA: none. Has been continent of bowel and bladder. LBM 3/22/2023. Chair/bed alarms in use and call light in reach. Will monitor for safety.  Electronically signed by Shelley Aldrich RN on 3/23/2023 at 4:52 PM

## 2023-03-24 LAB
GLUCOSE BLD-MCNC: 113 MG/DL (ref 70–99)
GLUCOSE BLD-MCNC: 116 MG/DL (ref 70–99)
GLUCOSE BLD-MCNC: 131 MG/DL (ref 70–99)
GLUCOSE BLD-MCNC: 132 MG/DL (ref 70–99)
PERFORMED ON: ABNORMAL

## 2023-03-24 PROCEDURE — 97110 THERAPEUTIC EXERCISES: CPT

## 2023-03-24 PROCEDURE — 97110 THERAPEUTIC EXERCISES: CPT | Performed by: PHYSICAL THERAPIST

## 2023-03-24 PROCEDURE — 97530 THERAPEUTIC ACTIVITIES: CPT | Performed by: PHYSICAL THERAPIST

## 2023-03-24 PROCEDURE — 97116 GAIT TRAINING THERAPY: CPT | Performed by: PHYSICAL THERAPIST

## 2023-03-24 PROCEDURE — 1280000000 HC REHAB R&B

## 2023-03-24 PROCEDURE — 6370000000 HC RX 637 (ALT 250 FOR IP): Performed by: PHYSICAL MEDICINE & REHABILITATION

## 2023-03-24 PROCEDURE — 97535 SELF CARE MNGMENT TRAINING: CPT

## 2023-03-24 PROCEDURE — 97530 THERAPEUTIC ACTIVITIES: CPT

## 2023-03-24 PROCEDURE — 6360000002 HC RX W HCPCS: Performed by: PHYSICAL MEDICINE & REHABILITATION

## 2023-03-24 RX ORDER — LISINOPRIL 10 MG/1
10 TABLET ORAL DAILY
Status: DISCONTINUED | OUTPATIENT
Start: 2023-03-25 | End: 2023-03-31 | Stop reason: HOSPADM

## 2023-03-24 RX ADMIN — METHOCARBAMOL TABLETS 750 MG: 750 TABLET, COATED ORAL at 12:34

## 2023-03-24 RX ADMIN — METHOCARBAMOL TABLETS 750 MG: 750 TABLET, COATED ORAL at 21:04

## 2023-03-24 RX ADMIN — ACETAMINOPHEN 650 MG: 325 TABLET ORAL at 03:33

## 2023-03-24 RX ADMIN — HEPARIN SODIUM 5000 UNITS: 5000 INJECTION INTRAVENOUS; SUBCUTANEOUS at 21:04

## 2023-03-24 RX ADMIN — OXYCODONE HYDROCHLORIDE 10 MG: 10 TABLET ORAL at 07:37

## 2023-03-24 RX ADMIN — HEPARIN SODIUM 5000 UNITS: 5000 INJECTION INTRAVENOUS; SUBCUTANEOUS at 08:35

## 2023-03-24 RX ADMIN — OXYCODONE HYDROCHLORIDE 10 MG: 10 TABLET ORAL at 23:08

## 2023-03-24 RX ADMIN — METHOCARBAMOL TABLETS 750 MG: 750 TABLET, COATED ORAL at 17:06

## 2023-03-24 RX ADMIN — METFORMIN HYDROCHLORIDE 500 MG: 500 TABLET ORAL at 07:39

## 2023-03-24 RX ADMIN — ATENOLOL 50 MG: 50 TABLET ORAL at 08:38

## 2023-03-24 RX ADMIN — TAMSULOSIN HYDROCHLORIDE 0.8 MG: 0.4 CAPSULE ORAL at 07:39

## 2023-03-24 RX ADMIN — METFORMIN HYDROCHLORIDE 500 MG: 500 TABLET ORAL at 17:05

## 2023-03-24 RX ADMIN — ROSUVASTATIN CALCIUM 5 MG: 10 TABLET, FILM COATED ORAL at 21:04

## 2023-03-24 RX ADMIN — METHOCARBAMOL TABLETS 750 MG: 750 TABLET, COATED ORAL at 08:35

## 2023-03-24 RX ADMIN — ALLOPURINOL 300 MG: 300 TABLET ORAL at 07:39

## 2023-03-24 ASSESSMENT — PAIN SCALES - GENERAL
PAINLEVEL_OUTOF10: 8
PAINLEVEL_OUTOF10: 0
PAINLEVEL_OUTOF10: 0
PAINLEVEL_OUTOF10: 1
PAINLEVEL_OUTOF10: 1
PAINLEVEL_OUTOF10: 7
PAINLEVEL_OUTOF10: 1
PAINLEVEL_OUTOF10: 2
PAINLEVEL_OUTOF10: 4
PAINLEVEL_OUTOF10: 6
PAINLEVEL_OUTOF10: 3
PAINLEVEL_OUTOF10: 0

## 2023-03-24 ASSESSMENT — PAIN DESCRIPTION - ORIENTATION
ORIENTATION: RIGHT;LEFT
ORIENTATION: RIGHT

## 2023-03-24 ASSESSMENT — PAIN DESCRIPTION - DESCRIPTORS
DESCRIPTORS: ACHING
DESCRIPTORS: SHARP;ACHING

## 2023-03-24 ASSESSMENT — PAIN - FUNCTIONAL ASSESSMENT
PAIN_FUNCTIONAL_ASSESSMENT: PREVENTS OR INTERFERES SOME ACTIVE ACTIVITIES AND ADLS
PAIN_FUNCTIONAL_ASSESSMENT: ACTIVITIES ARE NOT PREVENTED

## 2023-03-24 ASSESSMENT — PAIN DESCRIPTION - LOCATION
LOCATION: HIP
LOCATION: LEG
LOCATION: LEG;HIP
LOCATION: HIP;LEG

## 2023-03-24 ASSESSMENT — PAIN SCALES - WONG BAKER
WONGBAKER_NUMERICALRESPONSE: 0
WONGBAKER_NUMERICALRESPONSE: 0
WONGBAKER_NUMERICALRESPONSE: 2
WONGBAKER_NUMERICALRESPONSE: 0

## 2023-03-24 ASSESSMENT — PAIN DESCRIPTION - FREQUENCY
FREQUENCY: CONTINUOUS

## 2023-03-24 ASSESSMENT — PAIN DESCRIPTION - PAIN TYPE
TYPE: SURGICAL PAIN

## 2023-03-24 ASSESSMENT — PAIN DESCRIPTION - ONSET
ONSET: ON-GOING

## 2023-03-24 NOTE — PROGRESS NOTES
Physical Therapy  Facility/Department: [facility]  Rehabilitation Physical Therapy Treatment Note    NAME: Gyu Cadena  : 1945 (78 y.o.)  MRN: [id number]  CODE STATUS: Full Code    Date of Service: 3/24/23       Restrictions:  Restrictions/Precautions: Fall Risk, Surgical Protocols  Position Activity Restriction  Spinal Precautions: No Bending, No Lifting, No Twisting  Spinal Precautions: Limited to lifting 5 lbs  Other position/activity restrictions: prn knee immobilizer for unstable R knee (using mostly in therapy). SUBJECTIVE  Subjective  Subjective: Patient states that his R hip was really bothering him last night and pain medication has helped to decrease discomfort. Pain: R hip 2/10    OBJECTIVE  Cognition  Overall Cognitive Status: WFL  Orientation  Overall Orientation Status: Within Functional Limits    Functional Mobility  Roll Right  Assistance Level: Supervision  Skilled Clinical Factors: R hip pain decreases on R side  Sit to Supine  Assistance Level: Stand by assist  Skilled Clinical Factors: Able to lift BLE onto mat table and maintain spinal precautions  Supine to Sit  Assistance Level: Stand by assist  Skilled Clinical Factors: Able to log roll w/o VC; improved ability to push up with BUE to come to full sit on EOB  Balance  Sitting Balance: Stand by assistance  Standing Balance: Contact guard assistance (RW)  Transfers  Surface: Wheelchair; To mat;From mat  Additional Factors: Set-up; Verbal cues; Increased time to complete; Without handrails  Device: Walker (RW)  Sit to Stand  Assistance Level: Contact guard assist  Skilled Clinical Factors: for safety  Stand to Sit  Assistance Level: Contact guard assist  Skilled Clinical Factors: for safety  Bed To/From Chair  Technique: Stand pivot  Assistance Level: Contact guard assist  Skilled Clinical Factors: w/c <> mat table w/ RW, taking short staggered steps to mat table    Environmental Mobility  Ambulation  Surface: Level w/ exercise)  Discharge Recommendations: Continue to assess pending progress;Home with Home health PT;Patient would benefit from continued therapy after discharge  PT Equipment Recommendations  Equipment Needed: Yes  Walker: Rolling    Goals  Patient Goals   Patient Goals : \"to return home\"  Short Term Goals  Time Frame for Short Term Goals: 1 week  Short Term Goal 1: Amb 150' w/ RW at supervision  Short Term Goal 2: Navigate curb step w/ RW at SBA  Short Term Goal 3: Bed mobility at MOD I  Short Term Goal 4: Transfers w/ RW at SBA  Short Term Goal 5: evaluate stairs  Long Term Goals  Time Frame for Long Term Goals : STG = LTG    PLAN OF CARE/SAFETY  Physcial Therapy Plan  General Plan:  minutes of therapy at least 5 out of 7 days a week  Days Per Week: 5 Days  Hours Per Day: 1.5 hours  Therapy Duration: 1 Week  Current Treatment Recommendations: Strengthening;ROM;Balance training;Functional mobility training;Transfer training;ADL/Self-care training; Endurance training;Gait training;Stair training;Manual;Home exercise program;Safety education & training;Patient/Caregiver education & training;Equipment evaluation, education, & procurement; Therapeutic activities  Safety Devices  Type of Devices: All fall risk precautions in place;Gait belt;Patient at risk for falls; Left in chair (returned to room w/ transport)    EDUCATION  Education  Education Given To: Patient  Education Provided: Role of Therapy;Plan of Care;Precautions; Safety;ADL Function;Transfer Training;Mobility Training; Fall Prevention Strategies  Education Provided Comments: Educated on amb w/o brace and exercises to improve quadriceps strength  Education Method: Demonstration;Verbal  Barriers to Learning: None  Education Outcome: Verbalized understanding;Demonstrated understanding;Continued education needed  Skilled Clinical Factors: Recommend wearing R knee immobilizing brace for standing and mobility for ambulation to decrease risk of R knee buckling and decrease fall risk      Therapy Time   Individual Concurrent Group Co-treatment   Time In 200         Time Out 1200         Minutes 39                 PARRIS SORIANO, [facility]  Therapist was present, directed the patient's care, made skilled judgement, and was responsible for assessment and treatment of the patient.         Ambrocio Umanzor, 03/24/23 at 12:19 PM    [facility], [facility]

## 2023-03-24 NOTE — FLOWSHEET NOTE
Patient admitted to ARU on 3/19/23 with Dx of lumbar disc disease with radiculopathy; s/p L2-L4 TLIF at [facility] on 3/13 by Renaldo Worley. A/Ox4. Transfers with no device, ambulates with RW. Mobility restrictions: WBAT, no twisting or bending. Knees tend to buckle. Complains of BLE and bilateral hip pain at  night especially after ambulating to and from the bathroom every two hours. On prn tylenol and oxycodone. On regular 5 carb diet, tolerating well. Medications taken whole. On heparin for DVT prophylaxis. Skin: surgical incision to back intact. Dressing intact. Oxygen: room air. LDA: none. Has been continent/incontinent of bowel and continent of bladder. LBM 3/22. Chair/bed alarms in use and call light in reach. Will monitor for safety.

## 2023-03-24 NOTE — PROGRESS NOTES
Curbs: 1  Additional Factors: Increased time to complete  Assistance Level: Contact guard assist  Skilled Clinical Factors: No VC needed for navigating curb step  Wheelchair:     Assessment:  Assessment: Patient is a 79 yo M with pmh HTN, HLD, DM2, obesity, gout, and lumbar DDD with radiculopathy (s/p prior L3-S1 laminectomy and L4-S1 fusion) presents to ARU below baseline function s/p L2-4 lumbar fusion on 3/13/23. Pt PLOF is Indep for functional mobility and ambulation without assistive device. Currently, pt is supervision-SBA x 1 for bed mobility, CGA x 1 for transfers, ambulating 80' x 2 w/ knee immobilizer and RW, and for navigating curb step w/ RW. Pt demonstrates unsteadiness of R knee w/ fatigue and increased buckling but has improved stability with R knee immobilizing brace w/ transfers and amb. Patient needs less cueing for hand placement w/ transfers and demonstrates improvement with maintaining spinal precautions. Pt would benefit from short stay at ARU to increase strength, balance, endurance, functional mobility, and decrease risk of falls in order to return home safely. Activity Tolerance: Patient limited by endurance, Patient limited by pain, Patient tolerated treatment well (Increased bilateral hip pain (R > L))  Discharge Recommendations: Continue to assess pending progress, Home with Home health PT, Patient would benefit from continued therapy after discharge      Occupational therapy:   Feeding     Grooming/Oral Hygiene  Assistance Level: Stand by assist  Skilled Clinical Factors: stance at sink to wash hands.   UE Bathing  Assistance Level: Modified independent  Skilled Clinical Factors: seated on shower chair  LE Bathing  Assistance Level: Contact guard assist  Skilled Clinical Factors: long sponge for LE, stood to bathe buttocks with caution, cues to use left hand which is less twisting of back  UE Dressing  Assistance Level: Set-up  Skilled Clinical Factors: [address] Level: Minimal assistance  Skilled Clinical Factors: Assist to doff/don brace seated from EOB, using footstool to rest RLE onto. Pt needing Mod/Min A (assist to fasten lower straps-states wife can assist)  Putting On/Taking Off Footwear  Assistance Level: Set-up  Skilled Clinical Factors: socks and shoes by crossing legs after set up with effort for right foot - more difficulty crossing leg  Toileting  Assistance Level: Stand by assist  Skilled Clinical Factors: stood at commode to urinate, managing clothes w/o difficutly. Assessment:  Assessment: Pt tolerated session well. Pt completing functional transfers, short distance mob using RW. Pt needing up to CG/SBA, min cues for safety. Pt completed bed mob, demonstrating using log roll techn, SBA. Pt practiced doff/don of knee immobilizer brace, from EOB and using footstool to rest RLE onto, needing Min A overall and will most likely need assist for managing lower straps if wears at home, d/t back precautions. Pt stating wife will be able to assist. Pt standing for toileting and grooming, SBA. Cont poc. Activity Tolerance: Patient limited by endurance, Patient limited by pain, Patient tolerated treatment well (Increased bilateral hip pain (R > L))  Discharge Recommendations: Home with Home health OT, Home with assist PRN    Speech therapy:            PT  Position Activity Restriction  Spinal Precautions: No Bending, No Lifting, No Twisting  Spinal Precautions: Limited to lifting 5 lbs  Other position/activity restrictions: prn knee immobilizer for unstable R knee (using mostly in therapy).   Objective     Sit to Stand: Contact guard assistance, Minimal Assistance (RW)  Stand to Sit: Contact guard assistance, Maximum Assistance, Minimal Assistance (RW)  Bed to Chair: Contact guard assistance, Minimal assistance (EOB <> recliner w/ RW)  Device: Rolling Walker  Assistance: Contact guard assistance  Distance: 20' w/ 2 turns within the gym, additional distances within unit

## 2023-03-24 NOTE — PROGRESS NOTES
PHYSICAL THERAPY  Progress Note   Second Session    Patient Name: Ramo Aguero  Medical Record Number: [id number]    Treatment Diagnosis: Impaired functional mobility      Chart Reviewed: Yes   Restrictions/Precautions: Fall Risk, Surgical Protocols Other position/activity restrictions: prn knee immobilizer for unstable R knee (using mostly in therapy). Additional Pertinent Hx: DDD, Type 2 Diabetes, HTN, HLD, BPH, cervical and lumbar stenosis, gout, L3-S1 laminectomy and L4-S1 fusion (2019)        Subjective: Patient agreeable to activity. Objective    Seated: RLE quad set x 15, cues to maximize quad contraction. Pt able to stand with SBA, then completed LLE march 2 x 10, cues to initiate quad set RLE during each march. Pt able to ambulate 30' with RW, cues to maximize R quad contraction, minimal knee buckling noted, no LOB, SBA. Pt able to complete mini-squat x 10 with (B) UE support, x 10 with single hand support, x 10 without UE support, cues to maximize R knee control. Pt able to ambulate 40' with RW to low mat, SBA, no immobilizer. Pt completed bed mobility with supervision. He practiced SAQ 2 x 10, and RLE quad isometric with Min A, cues for technique. Assessment: Pt able to ambulate short distance several trials with RW, SBA, no immobilizer, no R knee buckling. He tolerated supported and unsupported R knee strengthening with cues for R quad activation. R knee control is gradually improving. Recommend continued therapy to further improve strength, balance, endurance, coordination, independence ambulating.        Safety Device - Type of devices:  [x]  All fall risk precautions in place [] Bed alarm in place  [] Call light within reach [] Chair alarm in place [] Positioning belt [x] Gait belt [] Patient at risk for falls [] Left in bed [] Left in chair [] Telesitter in use [] Sitter present [] Nurse notified []  None      Therapy Time   Individual Co-treatment   Time In 1300     Time Out [id number] [address]         Electronically signed by Kanchan Mcnamara, [place] [id number] on 3/24/2023 at 1:12 PM

## 2023-03-24 NOTE — CARE COORDINATION
Team Conference held on Thursday, 3-. Team reviewed barriers:  knee buckling, right hip pain and a history of falls. They report he is motivated. Team recommends home on Friday, 3- with continued stay to continue his progress in personal care and ambulation needs. Home care orders for sn/pt/ot. DME:    wheels for walker , TSF, TTB, 3 piece hip kit. Met with patient and wife to review. They are going to borrow a wh walker; asked them to bring in for fitting. Wife will obtain the other items from [place]. They are in agreement of continued stay. Reviewed CMS star rated list of agencies for their review and provided education regarding star rating system. Next conference will be Thursday, 3-.   [facility]     Case Management   [phone]    3/24/2023  8:51 AM

## 2023-03-24 NOTE — PROGRESS NOTES
Occupational Therapy  Facility/Department: [facility]  Rehabilitation Occupational Therapy Daily Treatment Note    Date: 3/24/23  Patient Name: Jazzy Gilliam       Room: C2B-4109/6934-41  MRN: [id number]  Account: [de-identified]   : 1945  (71 y.o.) Gender: male                    Past Medical History:  has a past medical history of BPH (benign prostatic hyperplasia), Cervical stenosis of spinal canal, DM2 (diabetes mellitus, type 2) ([facility] [address] Essential hypertension, Gout, Hyperlipidemia, and Lumbar stenosis. Past Surgical History:   has a past surgical history that includes Lumbar spine surgery and Lumbar spine surgery (2023). Restrictions  Restrictions/Precautions: Fall Risk, Surgical Protocols  Spinal Precautions: Limited to lifting 5 lbs  Other position/activity restrictions: prn knee immobilizer for unstable R knee (using mostly in therapy).   Equipment Used: Wheelchair, Bed    Subjective  Subjective: pt met bedside, seated in recliner, agreeable to OT, shower  Restrictions/Precautions: Fall Risk;Surgical Protocols             Objective     Cognition  Overall Cognitive Status: WFL  Orientation  Overall Orientation Status: Within Functional Limits         ADL  Grooming/Oral Hygiene  Assistance Level: Modified independent  Skilled Clinical Factors: seated in wheelchair for oral care, hair, shaved in shower  Upper Extremity Bathing  Assistance Level: Modified independent  Skilled Clinical Factors: seated on shower chair  Lower Extremity Bathing  Assistance Level: Stand by assist  Skilled Clinical Factors: long sponge for LE, stood to bathe buttocks with caution, cues to use left hand which is less twisting of back  Upper Extremity Dressing  Assistance Level: Modified independent  Skilled Clinical Factors: pt gathered shirt from bag next to him, shirt off inde, will replace after dressing applied by nursing  Lower Extremity Dressing  Assistance Level: Contact guard assist  Skilled Clinical Factors: able to thread briefs and pants over feet, reacher as needed, CGA for balance - pants over hips due to fatigue, right knee buckling  Putting On/Taking Off Footwear  Assistance Level: Set-up  Skilled Clinical Factors: socks and shoes by crossing legs after set up with effort for right foot - more difficulty crossing leg  Toileting  Assistance Level: Stand by assist  Skilled Clinical Factors: managed clothing down. Stood to urinate at commode, then sat to attempt BM without success. Pt attempted to kick pants off left foot in stand, right  knee buckling, but did not require assist.  Educated pt to sit to remove clothing from feet  Toilet Transfers  Equipment: Grab bars  Assistance Level: Contact guard assist  Skilled Clinical Factors: wheelchair to/from commode with grab bar  Tub/Shower Transfers  Type: Shower  Transfer From: Wheelchair  Transfer To: Shower chair with back  Assistance Level: Contact guard assist  Skilled Clinical Factors: wheelchair to shower chair with grab bars for support, cautious regarding right knee buckling          Functional Mobility  Device: Wheelchair  Activity: To/From bathroom  Assistance Level: Modified independent  Skilled Clinical Factors: wheechair for safety due to [address] not in place prior to ADL  Transfers  Surface: To chair with arms;From chair with arms; Wheelchair;Standard toilet       PM session  Pt met in dept, agreeable to OT    UE ex -  Resistive Exercises: 3 lb dumbbell to improve activity tolerance for ADL, mobiltiy - 20 rep shoulder flex/ext,  horizontal abd/add, elbow flex/ext, sup/pron, wrist flex/ext    Pt amb today without KI in PT. Pt able to amb 20 ft to kitchen, seated for short break, then cont to retrieve egg from refrigerator,  - cues to place in basket to carry vs hand. Once to stove, he stated he needed to urinate. AMb to bathroom, but began knee buckling due to fatigue at the end of the day. Requested to stand to urinate, but unable to stand safely. procurement;Patient/Caregiver education & training;Self-Care / ADL; Home management training    Goals  Patient Goals   Patient goals : \"I want to be able to walk around, get to the bathroom, do most of my bathing and dressing  -- my wife can do my socks\"  Short Term Goals  Time Frame for Short Term Goals: 1 week pt will. .  Short Term Goal 1: bathe with min assist and AD  Short Term Goal 2: dress UB with set up, LB with min assist and AD  Short Term Goal 3: toilet with min assist and grab bars  Short Term Goal 4: transfers and functional mobility with CGA and AD  Short Term Goal 5: increase activity tolerance to stand 3 min for ADL/IADL tasks  Long Term Goals  Time Frame for Long Term Goals : 10-14 days pt will. .  Long Term Goal 1: bathe indep with AD  Long Term Goal 2: dress UB and LB indep with AD as needed  Long Term Goal 3: toilet indep with grab bar for safety  Long Term Goal 4: transfers and functional mobiltiy indep with AD, simple meal prep indep  Long Term Goal 5: assess for DME                   Therapy Time   Individual Concurrent Group Co-treatment   Time In 0915         Time Out 1015         Minutes 60         Timed Code Treatment Minutes: 60 Minutes     Therapy Time     Individual Co-treatment   Time In 52 [facility]     Time Out 1600     Minutes [address], OTR/L 770

## 2023-03-24 NOTE — PLAN OF CARE
Minimal or absence of nausea and vomiting  3/23/2023 2328 by Marlon Dyer RN  Outcome: Progressing  3/23/2023 1535 by Nic Cortez RN  Outcome: Progressing  Goal: Maintains or returns to baseline bowel function  3/23/2023 2328 by Marlon Dyer RN  Outcome: Progressing  3/23/2023 1535 by Nic Cortez RN  Outcome: Progressing  Goal: Maintains adequate nutritional intake  3/23/2023 2328 by Marlon Dyer RN  Outcome: Progressing  3/23/2023 1535 by Nic Cortez RN  Outcome: Progressing     Problem: Genitourinary - Adult  Goal: Absence of urinary retention  3/23/2023 2328 by Marlon Dyer RN  Outcome: Progressing  3/23/2023 1535 by Nic Cortez RN  Outcome: Progressing     Problem: Infection - Adult  Goal: Absence of infection at discharge  3/23/2023 2328 by Marlon Dyer RN  Outcome: Progressing  3/23/2023 1535 by Nic Cortez RN  Outcome: Progressing  Goal: Absence of infection during hospitalization  3/23/2023 2328 by Marlon Dyer RN  Outcome: Progressing  3/23/2023 1535 by Nic Cortez RN  Outcome: Progressing  Goal: Absence of fever/infection during anticipated neutropenic period  3/23/2023 2328 by Marlon Dyer RN  Outcome: Progressing  3/23/2023 1535 by Nic Cortez RN  Outcome: Progressing     Problem: Metabolic/Fluid and Electrolytes - Adult  Goal: Electrolytes maintained within normal limits  3/23/2023 2328 by Marlon Dyer RN  Outcome: Progressing  3/23/2023 1535 by Nic Cortez RN  Outcome: Progressing  Goal: Hemodynamic stability and optimal renal function maintained  3/23/2023 2328 by Marlon Dyer RN  Outcome: Progressing  3/23/2023 1535 by Nic Cortez RN  Outcome: Progressing  Goal: Glucose maintained within prescribed range  3/23/2023 2328 by Marlon Dyer RN  Outcome: Progressing  3/23/2023 1535 by Nic Cortez RN  Outcome: Progressing     Problem: Hematologic - Adult  Goal: Maintains hematologic

## 2023-03-24 NOTE — PROGRESS NOTES
Patient admitted to rehab with lumbar disc disease with radiculopathy. A/Ox4 but forgetful at times. Transfers with walker x1. Mobility restrictions: no twisting or bending. On Regular 5 CCC diet, tolerating well. Medications taken whole with thin liquids. On heparin for DVT prophylaxis. Skin: incision to lower back covered with dry dressing, changed today . Oxygen: RA. LDA: none. Has been continent of bowel and bladder. LBM 3/24/2023. Chair/bed alarms in use and call light in reach. Will monitor for safety.  Electronically signed by Barbara Baltazar RN on 3/24/2023 at 5:44 PM

## 2023-03-25 LAB
GLUCOSE BLD-MCNC: 114 MG/DL (ref 70–99)
GLUCOSE BLD-MCNC: 118 MG/DL (ref 70–99)
GLUCOSE BLD-MCNC: 125 MG/DL (ref 70–99)
GLUCOSE BLD-MCNC: 161 MG/DL (ref 70–99)
PERFORMED ON: ABNORMAL

## 2023-03-25 PROCEDURE — 1280000000 HC REHAB R&B

## 2023-03-25 PROCEDURE — 6370000000 HC RX 637 (ALT 250 FOR IP): Performed by: PHYSICAL MEDICINE & REHABILITATION

## 2023-03-25 PROCEDURE — 6360000002 HC RX W HCPCS: Performed by: PHYSICAL MEDICINE & REHABILITATION

## 2023-03-25 PROCEDURE — 94760 N-INVAS EAR/PLS OXIMETRY 1: CPT

## 2023-03-25 RX ADMIN — METHOCARBAMOL TABLETS 750 MG: 750 TABLET, COATED ORAL at 20:53

## 2023-03-25 RX ADMIN — METFORMIN HYDROCHLORIDE 500 MG: 500 TABLET ORAL at 17:05

## 2023-03-25 RX ADMIN — HEPARIN SODIUM 5000 UNITS: 5000 INJECTION INTRAVENOUS; SUBCUTANEOUS at 08:25

## 2023-03-25 RX ADMIN — SENNOSIDES AND DOCUSATE SODIUM 1 TABLET: 50; 8.6 TABLET ORAL at 08:21

## 2023-03-25 RX ADMIN — HEPARIN SODIUM 5000 UNITS: 5000 INJECTION INTRAVENOUS; SUBCUTANEOUS at 20:53

## 2023-03-25 RX ADMIN — ATENOLOL 50 MG: 50 TABLET ORAL at 08:23

## 2023-03-25 RX ADMIN — ROSUVASTATIN CALCIUM 5 MG: 10 TABLET, FILM COATED ORAL at 20:53

## 2023-03-25 RX ADMIN — LISINOPRIL 10 MG: 10 TABLET ORAL at 08:23

## 2023-03-25 RX ADMIN — ALLOPURINOL 300 MG: 300 TABLET ORAL at 08:21

## 2023-03-25 RX ADMIN — OXYCODONE 5 MG: 5 TABLET ORAL at 23:40

## 2023-03-25 RX ADMIN — METFORMIN HYDROCHLORIDE 500 MG: 500 TABLET ORAL at 08:24

## 2023-03-25 RX ADMIN — METHOCARBAMOL TABLETS 750 MG: 750 TABLET, COATED ORAL at 13:10

## 2023-03-25 RX ADMIN — METHOCARBAMOL TABLETS 750 MG: 750 TABLET, COATED ORAL at 17:04

## 2023-03-25 RX ADMIN — TAMSULOSIN HYDROCHLORIDE 0.8 MG: 0.4 CAPSULE ORAL at 08:20

## 2023-03-25 RX ADMIN — METHOCARBAMOL TABLETS 750 MG: 750 TABLET, COATED ORAL at 08:20

## 2023-03-25 RX ADMIN — ACETAMINOPHEN 650 MG: 325 TABLET ORAL at 08:24

## 2023-03-25 ASSESSMENT — PAIN DESCRIPTION - ORIENTATION: ORIENTATION: RIGHT

## 2023-03-25 ASSESSMENT — PAIN SCALES - GENERAL
PAINLEVEL_OUTOF10: 3
PAINLEVEL_OUTOF10: 0
PAINLEVEL_OUTOF10: 3
PAINLEVEL_OUTOF10: 5

## 2023-03-25 ASSESSMENT — PAIN DESCRIPTION - LOCATION: LOCATION: LEG

## 2023-03-25 ASSESSMENT — PAIN DESCRIPTION - DESCRIPTORS: DESCRIPTORS: ACHING;DISCOMFORT

## 2023-03-25 NOTE — PROGRESS NOTES
This is a 78 y.o.  male admitted on 3/19/2023 with Lumbar disc disease with radiculopathy [M51.16]. A&O X 4. Reported  pain 8/10 to  right hip, pain medication given as ordered, see KEVEN Senior Active bowel sounds. Last BM 3/24/2023. Patient is continent of bowel and bladder. On a regular, 5 carb diet. Medications taken whole with thins. Skin: incision to the lower back. Transfers with  the wheelchair. On heparin to prevent DVT. HS medication given. Tolerated well. Call light and bedside table within reach. Patient instructed to call if there is any needs or changes.

## 2023-03-25 NOTE — PROGRESS NOTES
Patient A&Ox4, VSS. Surgical dressing dry and intact with scant drainage. Neuro checks unchanged, pt reports continuous RLE numbness. Pain well managed per MAR. Pt denies nausea, tolerating diet well. Pt ambulating with walker and GB, tolerating well. Pt instructed to call out for needs. Fall precautions in place.

## 2023-03-25 NOTE — PLAN OF CARE
Problem: Discharge Planning  Goal: Discharge to home or other facility with appropriate resources  Outcome: Progressing     Problem: Skin/Tissue Integrity  Goal: Absence of new skin breakdown  Description: 1. Monitor for areas of redness and/or skin breakdown  2. Assess vascular access sites hourly  3. Every 4-6 hours minimum:  Change oxygen saturation probe site  4. Every 4-6 hours:  If on nasal continuous positive airway pressure, respiratory therapy assess nares and determine need for appliance change or resting period.   Outcome: Progressing     Problem: Pain  Goal: Verbalizes/displays adequate comfort level or baseline comfort level  Outcome: Progressing     Problem: Skin/Tissue Integrity - Adult  Goal: Skin integrity remains intact  Outcome: Progressing     Problem: Skin/Tissue Integrity - Adult  Goal: Incisions, wounds, or drain sites healing without S/S of infection  Outcome: Progressing     Problem: Metabolic/Fluid and Electrolytes - Adult  Goal: Electrolytes maintained within normal limits  Outcome: Progressing

## 2023-03-25 NOTE — PLAN OF CARE
Problem: Discharge Planning  Goal: Discharge to home or other facility with appropriate resources  3/25/2023 1514 by Emma Parmar RN  Outcome: Progressing     Problem: Skin/Tissue Integrity  Goal: Absence of new skin breakdown  Description: 1. Monitor for areas of redness and/or skin breakdown  2. Assess vascular access sites hourly  3. Every 4-6 hours minimum:  Change oxygen saturation probe site  4. Every 4-6 hours:  If on nasal continuous positive airway pressure, respiratory therapy assess nares and determine need for appliance change or resting period.   3/25/2023 1514 by Emma Parmar RN  Outcome: Progressing     Problem: Safety - Adult  Goal: Free from fall injury  Outcome: Progressing

## 2023-03-26 VITALS
DIASTOLIC BLOOD PRESSURE: 71 MMHG | BODY MASS INDEX: 33.11 KG/M2 | HEART RATE: 71 BPM | OXYGEN SATURATION: 96 % | HEIGHT: 68 IN | WEIGHT: 218.48 LBS | SYSTOLIC BLOOD PRESSURE: 122 MMHG | TEMPERATURE: 98.5 F | RESPIRATION RATE: 16 BRPM

## 2023-03-26 LAB
GLUCOSE BLD-MCNC: 124 MG/DL (ref 70–99)
GLUCOSE BLD-MCNC: 134 MG/DL (ref 70–99)
GLUCOSE BLD-MCNC: 155 MG/DL (ref 70–99)
GLUCOSE BLD-MCNC: 167 MG/DL (ref 70–99)
PERFORMED ON: ABNORMAL

## 2023-03-26 PROCEDURE — 6360000002 HC RX W HCPCS: Performed by: PHYSICAL MEDICINE & REHABILITATION

## 2023-03-26 PROCEDURE — 94760 N-INVAS EAR/PLS OXIMETRY 1: CPT

## 2023-03-26 PROCEDURE — 1280000000 HC REHAB R&B

## 2023-03-26 PROCEDURE — 6370000000 HC RX 637 (ALT 250 FOR IP): Performed by: PHYSICAL MEDICINE & REHABILITATION

## 2023-03-26 RX ADMIN — LISINOPRIL 10 MG: 10 TABLET ORAL at 08:27

## 2023-03-26 RX ADMIN — ALLOPURINOL 300 MG: 300 TABLET ORAL at 08:27

## 2023-03-26 RX ADMIN — METHOCARBAMOL TABLETS 750 MG: 750 TABLET, COATED ORAL at 08:27

## 2023-03-26 RX ADMIN — TAMSULOSIN HYDROCHLORIDE 0.8 MG: 0.4 CAPSULE ORAL at 08:27

## 2023-03-26 RX ADMIN — METHOCARBAMOL TABLETS 750 MG: 750 TABLET, COATED ORAL at 17:00

## 2023-03-26 RX ADMIN — ATENOLOL 50 MG: 50 TABLET ORAL at 08:27

## 2023-03-26 RX ADMIN — HEPARIN SODIUM 5000 UNITS: 5000 INJECTION INTRAVENOUS; SUBCUTANEOUS at 21:13

## 2023-03-26 RX ADMIN — Medication 3 MG: at 22:55

## 2023-03-26 RX ADMIN — ACETAMINOPHEN 650 MG: 325 TABLET ORAL at 02:54

## 2023-03-26 RX ADMIN — HEPARIN SODIUM 5000 UNITS: 5000 INJECTION INTRAVENOUS; SUBCUTANEOUS at 08:28

## 2023-03-26 RX ADMIN — METFORMIN HYDROCHLORIDE 500 MG: 500 TABLET ORAL at 08:28

## 2023-03-26 RX ADMIN — METHOCARBAMOL TABLETS 750 MG: 750 TABLET, COATED ORAL at 21:12

## 2023-03-26 RX ADMIN — METHOCARBAMOL TABLETS 750 MG: 750 TABLET, COATED ORAL at 12:07

## 2023-03-26 RX ADMIN — ROSUVASTATIN CALCIUM 5 MG: 10 TABLET, FILM COATED ORAL at 21:13

## 2023-03-26 RX ADMIN — METFORMIN HYDROCHLORIDE 500 MG: 500 TABLET ORAL at 17:00

## 2023-03-26 RX ADMIN — OXYCODONE 5 MG: 5 TABLET ORAL at 22:54

## 2023-03-26 ASSESSMENT — PAIN DESCRIPTION - ORIENTATION
ORIENTATION: RIGHT

## 2023-03-26 ASSESSMENT — PAIN SCALES - GENERAL
PAINLEVEL_OUTOF10: 0
PAINLEVEL_OUTOF10: 1
PAINLEVEL_OUTOF10: 3
PAINLEVEL_OUTOF10: 5
PAINLEVEL_OUTOF10: 3
PAINLEVEL_OUTOF10: 0

## 2023-03-26 ASSESSMENT — PAIN DESCRIPTION - DESCRIPTORS
DESCRIPTORS: ACHING;DISCOMFORT;SORE
DESCRIPTORS: ACHING
DESCRIPTORS: ACHING;DISCOMFORT;SORE

## 2023-03-26 ASSESSMENT — PAIN DESCRIPTION - LOCATION
LOCATION: HIP
LOCATION: LEG
LOCATION: HIP;LEG

## 2023-03-26 NOTE — PROGRESS NOTES
Pt admitted to rehab after TLIF L2-L4. Dressing to lower back CDI with small shadow drainage noted. Spinal precautions in place. Pt denies pain presently. Lungs clear. No sob or cough. On RA. Belly round and soft with active BS. LBM yesterday. Continent of urine. No edema noted. Call light in reach. Bed alarm on. Pt up to the BR with CGA and walker and GB.

## 2023-03-26 NOTE — PLAN OF CARE
Problem: Discharge Planning  Goal: Discharge to home or other facility with appropriate resources  Outcome: Progressing     Problem: Skin/Tissue Integrity  Goal: Absence of new skin breakdown  Description: 1. Monitor for areas of redness and/or skin breakdown  2. Assess vascular access sites hourly  3. Every 4-6 hours minimum:  Change oxygen saturation probe site  4. Every 4-6 hours:  If on nasal continuous positive airway pressure, respiratory therapy assess nares and determine need for appliance change or resting period. Outcome: Progressing     Problem: Safety - Adult  Goal: Free from fall injury  Outcome: Progressing  Note: Patient will be free of injury this shift. Patient is encouraged to call for assistance prior to getting out of bed. Call light within reach, bed in lowest position and locked, and bed alarm engaged. Non-slip socks on both feet. Bedside table within reach. Room and floor are free of clutter.         Problem: ABCDS Injury Assessment  Goal: Absence of physical injury  Outcome: Progressing     Problem: Pain  Goal: Verbalizes/displays adequate comfort level or baseline comfort level  Outcome: Progressing     Problem: Skin/Tissue Integrity - Adult  Goal: Skin integrity remains intact  Outcome: Progressing  Goal: Incisions, wounds, or drain sites healing without S/S of infection  Outcome: Progressing  Flowsheets  Taken 3/26/2023 1149  Incisions, Wounds, or Drain Sites Healing Without Sign and Symptoms of Infection: TWICE DAILY: Assess and document skin integrity  Taken 3/26/2023 0831  Incisions, Wounds, or Drain Sites Healing Without Sign and Symptoms of Infection: TWICE DAILY: Assess and document skin integrity

## 2023-03-26 NOTE — PROGRESS NOTES
Patient admitted to rehab with lumbar disc disease with radiculopathy. A/Ox4. Transfers with no device, ambulates with RW X1. Mobility restrictions: WBAT. On regular 5 carb  diet, tolerating well. Medications taken whole. On Heparin for DVT prophylaxis. Skin: surgical incision to lower back and buttock remain intact. Oxygen: room air. LDA: none. Has been continent of bowel and  bladder. LBM 3/24. Chair/bed alarms in use and call light in reach. Will monitor for safety.  Electronically signed by Val Wilcox RN, Fani Clemons on 3/26/23 at 11:54 AM EDT

## 2023-03-27 LAB
ANION GAP SERPL CALCULATED.3IONS-SCNC: 11 MMOL/L (ref 3–16)
BASOPHILS # BLD: 0 K/UL (ref 0–0.2)
BASOPHILS NFR BLD: 0.6 %
BUN SERPL-MCNC: 16 MG/DL (ref 7–20)
CALCIUM SERPL-MCNC: 9.5 MG/DL (ref 8.3–10.6)
CHLORIDE SERPL-SCNC: 101 MMOL/L (ref 99–110)
CO2 SERPL-SCNC: 26 MMOL/L (ref 21–32)
CREAT SERPL-MCNC: 0.6 MG/DL (ref 0.8–1.3)
DEPRECATED RDW RBC AUTO: 15.8 % (ref 12.4–15.4)
EOSINOPHIL # BLD: 0.1 K/UL (ref 0–0.6)
EOSINOPHIL NFR BLD: 1.9 %
GFR SERPLBLD CREATININE-BSD FMLA CKD-EPI: >60 ML/MIN/{1.73_M2}
GLUCOSE BLD-MCNC: 108 MG/DL (ref 70–99)
GLUCOSE BLD-MCNC: 132 MG/DL (ref 70–99)
GLUCOSE BLD-MCNC: 142 MG/DL (ref 70–99)
GLUCOSE BLD-MCNC: 149 MG/DL (ref 70–99)
GLUCOSE SERPL-MCNC: 140 MG/DL (ref 70–99)
HCT VFR BLD AUTO: 34.7 % (ref 40.5–52.5)
HGB BLD-MCNC: 11.7 G/DL (ref 13.5–17.5)
LYMPHOCYTES # BLD: 1.6 K/UL (ref 1–5.1)
LYMPHOCYTES NFR BLD: 24.6 %
MCH RBC QN AUTO: 28.8 PG (ref 26–34)
MCHC RBC AUTO-ENTMCNC: 33.8 G/DL (ref 31–36)
MCV RBC AUTO: 85.4 FL (ref 80–100)
MONOCYTES # BLD: 0.4 K/UL (ref 0–1.3)
MONOCYTES NFR BLD: 6.4 %
NEUTROPHILS # BLD: 4.4 K/UL (ref 1.7–7.7)
NEUTROPHILS NFR BLD: 66.5 %
PERFORMED ON: ABNORMAL
PLATELET # BLD AUTO: 323 K/UL (ref 135–450)
PMV BLD AUTO: 6.7 FL (ref 5–10.5)
POTASSIUM SERPL-SCNC: 4.3 MMOL/L (ref 3.5–5.1)
RBC # BLD AUTO: 4.06 M/UL (ref 4.2–5.9)
SODIUM SERPL-SCNC: 138 MMOL/L (ref 136–145)
WBC # BLD AUTO: 6.7 K/UL (ref 4–11)

## 2023-03-27 PROCEDURE — 97535 SELF CARE MNGMENT TRAINING: CPT

## 2023-03-27 PROCEDURE — 85025 COMPLETE CBC W/AUTO DIFF WBC: CPT

## 2023-03-27 PROCEDURE — 6370000000 HC RX 637 (ALT 250 FOR IP): Performed by: PHYSICAL MEDICINE & REHABILITATION

## 2023-03-27 PROCEDURE — 97110 THERAPEUTIC EXERCISES: CPT

## 2023-03-27 PROCEDURE — 1280000000 HC REHAB R&B

## 2023-03-27 PROCEDURE — 80048 BASIC METABOLIC PNL TOTAL CA: CPT

## 2023-03-27 PROCEDURE — 97530 THERAPEUTIC ACTIVITIES: CPT | Performed by: PHYSICAL THERAPIST

## 2023-03-27 PROCEDURE — 94760 N-INVAS EAR/PLS OXIMETRY 1: CPT

## 2023-03-27 PROCEDURE — 97116 GAIT TRAINING THERAPY: CPT | Performed by: PHYSICAL THERAPIST

## 2023-03-27 PROCEDURE — 36415 COLL VENOUS BLD VENIPUNCTURE: CPT

## 2023-03-27 PROCEDURE — 6360000002 HC RX W HCPCS: Performed by: PHYSICAL MEDICINE & REHABILITATION

## 2023-03-27 PROCEDURE — 97110 THERAPEUTIC EXERCISES: CPT | Performed by: PHYSICAL THERAPIST

## 2023-03-27 PROCEDURE — 97530 THERAPEUTIC ACTIVITIES: CPT

## 2023-03-27 RX ADMIN — ROSUVASTATIN CALCIUM 5 MG: 10 TABLET, FILM COATED ORAL at 21:14

## 2023-03-27 RX ADMIN — SENNOSIDES AND DOCUSATE SODIUM 1 TABLET: 50; 8.6 TABLET ORAL at 07:56

## 2023-03-27 RX ADMIN — DICLOFENAC SODIUM 4 G: 10 GEL TOPICAL at 21:15

## 2023-03-27 RX ADMIN — ACETAMINOPHEN 650 MG: 325 TABLET ORAL at 00:15

## 2023-03-27 RX ADMIN — HEPARIN SODIUM 5000 UNITS: 5000 INJECTION INTRAVENOUS; SUBCUTANEOUS at 21:14

## 2023-03-27 RX ADMIN — ACETAMINOPHEN 650 MG: 325 TABLET ORAL at 23:58

## 2023-03-27 RX ADMIN — ALLOPURINOL 300 MG: 300 TABLET ORAL at 07:56

## 2023-03-27 RX ADMIN — METFORMIN HYDROCHLORIDE 500 MG: 500 TABLET ORAL at 16:12

## 2023-03-27 RX ADMIN — ATENOLOL 50 MG: 50 TABLET ORAL at 07:56

## 2023-03-27 RX ADMIN — HEPARIN SODIUM 5000 UNITS: 5000 INJECTION INTRAVENOUS; SUBCUTANEOUS at 07:58

## 2023-03-27 RX ADMIN — METFORMIN HYDROCHLORIDE 500 MG: 500 TABLET ORAL at 07:56

## 2023-03-27 RX ADMIN — METHOCARBAMOL TABLETS 750 MG: 750 TABLET, COATED ORAL at 16:11

## 2023-03-27 RX ADMIN — Medication 3 MG: at 21:15

## 2023-03-27 RX ADMIN — METHOCARBAMOL TABLETS 750 MG: 750 TABLET, COATED ORAL at 21:14

## 2023-03-27 RX ADMIN — METHOCARBAMOL TABLETS 750 MG: 750 TABLET, COATED ORAL at 07:56

## 2023-03-27 RX ADMIN — OXYCODONE 5 MG: 5 TABLET ORAL at 22:18

## 2023-03-27 RX ADMIN — TAMSULOSIN HYDROCHLORIDE 0.8 MG: 0.4 CAPSULE ORAL at 07:56

## 2023-03-27 RX ADMIN — METHOCARBAMOL TABLETS 750 MG: 750 TABLET, COATED ORAL at 13:08

## 2023-03-27 RX ADMIN — LISINOPRIL 10 MG: 10 TABLET ORAL at 07:56

## 2023-03-27 ASSESSMENT — PAIN - FUNCTIONAL ASSESSMENT
PAIN_FUNCTIONAL_ASSESSMENT: ACTIVITIES ARE NOT PREVENTED

## 2023-03-27 ASSESSMENT — PAIN DESCRIPTION - ORIENTATION
ORIENTATION: RIGHT
ORIENTATION: LOWER

## 2023-03-27 ASSESSMENT — PAIN SCALES - GENERAL
PAINLEVEL_OUTOF10: 2
PAINLEVEL_OUTOF10: 0
PAINLEVEL_OUTOF10: 6
PAINLEVEL_OUTOF10: 0
PAINLEVEL_OUTOF10: 3
PAINLEVEL_OUTOF10: 0
PAINLEVEL_OUTOF10: 0

## 2023-03-27 ASSESSMENT — PAIN DESCRIPTION - LOCATION
LOCATION: LEG
LOCATION: HIP;LEG
LOCATION: BACK
LOCATION: HIP

## 2023-03-27 ASSESSMENT — PAIN DESCRIPTION - DESCRIPTORS
DESCRIPTORS: DISCOMFORT;DULL;ACHING
DESCRIPTORS: ACHING;DISCOMFORT

## 2023-03-27 NOTE — PROGRESS NOTES
Physical Therapy  Facility/Department: [address] REHAB  Rehabilitation Physical Therapy Treatment Note    NAME: Robert Charlton  : 1945 (78 y.o.)  MRN: [id number]  CODE STATUS: Full Code    Date of Service: 3/27/23       Restrictions:  Restrictions/Precautions: Fall Risk, Surgical Protocols  Position Activity Restriction  Spinal Precautions: No Bending, No Lifting, No Twisting  Spinal Precautions: Limited to lifting 5 lbs     SUBJECTIVE  Subjective  Subjective: Patient states that he did not work with therapy over the weekend, his R hip/sciatic nerve has been irritating him. Patient received acteminophen this AM.  Pain: 0/10 LBP, 1/10 R hip    OBJECTIVE  Cognition  Overall Cognitive Status: WFL  Orientation  Overall Orientation Status: Within Functional Limits    Functional Mobility  Balance  Sitting Balance: Stand by assistance  Standing Balance: Contact guard assistance  CGA x 1 for standing balance with patient using the bathroom w/ one hand on RW and while pt washed his hands at the sink w/ anterior lean on sink. Transfers  Surface: Wheelchair; To chair with arms  Additional Factors: Set-up; Increased time to complete;Verbal cues  Device: Walker (RW)  Sit to Stand  Assistance Level: Contact guard assist  Stand to Sit  Assistance Level: Contact guard assist    Environmental Mobility  Ambulation  Surface: Level surface  Device: Rolling walker  Distance: 76' + 130' with 2 turns, ~4 min seated rest break in w/c. Included navigation w/n room for bathroom and sink w/ RW  Activity: Within Unit  Activity Comments: w/o RLE immobilizing brace; additional assist for w/c follow  Additional Factors: Set-up; Verbal cues; Increased time to complete  Assistance Level: Contact guard assist  Gait Deviations: Slow halley;Decreased step length bilateral;Decreased heel strike right;Decreased heel strike left  Skilled Clinical Factors: CGA x 1 for safety.  Demonstrates occasional R knee buckle w/ L knee bend moment but able training;ADL/Self-care training; Endurance training;Gait training;Stair training;Manual;Home exercise program;Safety education & training;Patient/Caregiver education & training;Equipment evaluation, education, & procurement; Therapeutic activities  Safety Devices  Type of Devices: All fall risk precautions in place;Gait belt;Patient at risk for falls; Left in chair (returned to room w/ transport)    EDUCATION  Education  Education Given To: Patient  Education Provided: Role of Therapy;Plan of Care;Precautions; Safety;ADL Function;Transfer Training;Mobility Training;Equipment  Education Provided Comments: Educated on NuStep w/ LE only to improve quad strength, hip/knee mobility and maintain spinal precuations. Also educated on LE symptoms w/ pathology  Education Method: Demonstration;Verbal  Barriers to Learning: None  Education Outcome: Verbalized understanding;Demonstrated understanding;Continued education needed  Skilled Clinical Factors: Recommend wearing R knee immobilizing brace for standing and mobility for ambulation to decrease risk of R knee buckling and decrease fall risk      Therapy Time   Individual Concurrent Group Co-treatment   Time In 1030         Time Out 1115         Minutes 39           Second Session Therapy Time     Individual Co-treatment   Time In 1345     Time Out 1430     Minutes 238 Yanna Campos [facility] [[email or web address]]  Therapist was present, directed the patient's care, made skilled judgement, and was responsible for assessment and treatment of the patient.         Sole David, 03/27/23 at [facility], [address]

## 2023-03-27 NOTE — PROGRESS NOTES
Patient admitted to rehab with lumbar disc disease with radiculopathy. Patient is A&Ox 4. Transfers with front wheel walker. Mobility restrictions: WBAT. On regular 5 carb  diet, tolerating well. Medications taken whole. On Heparin for DVT prophylaxis. Skin: surgical incision to lower back and buttock. Unilateral swelling noted, incisions are pink in color. Oxygen: room air. LDA: none. Has been continent of bowel and  bladder. LBM 3/26/23. Chair/bed alarms in use and call light in reach. Will monitor for safety.  Electronically signed by Audrey Ro RN on 3/27/2023 at 2:14 PM

## 2023-03-27 NOTE — PROGRESS NOTES
and functional mobility with SBA/CGA for safety with history of right knee buckling - did not use KI this AM.  He stated he has had some back pain, right hip pain, but doing well this AM, feels better with movement. Discharge is planned for Friday, home with assist PRN, home OT. Recommend toilet safety frames and transfer tub bench. He owns a reacher  Activity Tolerance: Patient tolerated treatment well  Discharge Recommendations: Home with Home health OT; Home with assist PRN  OT Equipment Recommendations  Equipment Needed: Yes  Mobility Devices: ADL Assistive Devices  ADL Assistive Devices: Toilet Safety Frame;Transfer Tub Bench  Other: He owns a reacher  Safety Devices  Safety Devices in place: Yes  Type of devices: Gait belt;Call light within reach; Chair alarm in place; Left in chair    Patient Education  Education  Education Given To: Patient  Education Provided: ADL Function;Transfer Training; Safety  Education Provided Comments: sit to remove clothing from feet  Education Method: Demonstration;Verbal;Teach Back  Barriers to Learning: None  Education Outcome: Verbalized understanding;Demonstrated understanding;Continued education needed    Plan  Occupational Therapy Plan  Times Per Day: Twice a day  Days Per Week: 5 Days  Hours Per Day: 1.5 hours  Therapy Duration: 2 Weeks  Current Treatment Recommendations: Strengthening;Balance training;Functional mobility training; Endurance training; Safety education & training;Equipment evaluation, education, & procurement;Patient/Caregiver education & training;Self-Care / ADL; Home management training    Goals  Patient Goals   Patient goals : \"I want to be able to walk around, get to the bathroom, do most of my bathing and dressing  -- my wife can do my socks\"  Short Term Goals  Time Frame for Short Term Goals: 1 week pt will. .  Short Term Goal 1: bathe with min assist and AD  Short Term Goal 2: dress UB with set up, LB with min assist and AD  Short Term Goal 3: toilet with min assist and grab bars  Short Term Goal 4: transfers and functional mobility with CGA and AD  Short Term Goal 5: increase activity tolerance to stand 3 min for ADL/IADL tasks  Long Term Goals  Time Frame for Long Term Goals : 10-14 days pt will. .  Long Term Goal 1: bathe indep with AD  Long Term Goal 2: dress UB and LB indep with AD as needed  Long Term Goal 3: toilet indep with grab bar for safety  Long Term Goal 4: transfers and functional mobiltiy indep with AD, simple meal prep indep  Long Term Goal 5: assess for DME                   Therapy Time   Individual Concurrent Group Co-treatment   Time In 0915         Time Out 1015         Minutes 60         Timed Code Treatment Minutes: 60 Minutes     Therapy Time     Individual Co-treatment   Time In 1300     Time Out 1345     Minutes [address] [facility], OTR/L 770

## 2023-03-27 NOTE — PLAN OF CARE
Problem: Respiratory - Adult  Goal: Achieves optimal ventilation and oxygenation  Outcome: Progressing     Problem: Musculoskeletal - Adult  Goal: Return ADL status to a safe level of function  Outcome: Progressing

## 2023-03-27 NOTE — PROGRESS NOTES
Department of Physical Medicine & Rehabilitation  Progress Note    Patient Identification:  Ari Banda  [id number]  : 1945  Admit date: 3/19/2023    Chief Complaint: Lumbar disc disease with radiculopathy    Subjective:   No acute events overnight. Patient seen this am sitting up in room. Having ongoing right hip pain but does not want to change oral pain medication regimen. Willing to trial diclofenac gel. Labs reviewed. ROS: No f/c, n/v, cp     Objective:  Patient Vitals for the past 24 hrs:   BP Temp Temp src Pulse Resp SpO2 Weight   23 0935 -- -- -- -- -- 95 % --   23 0716 (!) 153/78 97.7 °F (36.5 °C) Oral 54 18 95 % --   23 0356 135/71 97.6 °F (36.4 °C) -- 57 16 95 % --   23 0350 -- -- -- -- -- -- 220 lb 10.9 oz (100.1 kg)   23 2324 -- -- -- -- 16 -- --   23 1953 122/71 98.5 °F (36.9 °C) Oral 71 16 96 % --   23 1429 114/63 98.5 °F (36.9 °C) Oral 64 16 95 % --     Const: Alert. No distress, pleasant. HEENT: Normocephalic, atraumatic. Normal sclera/conjunctiva. MMM. CV: Regular rate and rhythm. Resp: No respiratory distress. Lungs CTAB. Abd: Soft, nontender, nondistended, NABS+   Ext: No edema. MSK: Decreased spine ROM  Neuro: Alert, oriented, appropriately interactive. Strength 4/5 bilateral hip flexion, 4/5 right knee extension  Psych: Cooperative, appropriate mood and affect    Laboratory data: Available via EMR.    Last 24 hour lab  Recent Results (from the past 24 hour(s))   POCT Glucose    Collection Time: 23 11:23 AM   Result Value Ref Range    POC Glucose 124 (H) 70 - 99 mg/dl    Performed on ACCU-CHEK    POCT Glucose    Collection Time: 23  4:23 PM   Result Value Ref Range    POC Glucose 155 (H) 70 - 99 mg/dl    Performed on ACCU-CHEK    POCT Glucose    Collection Time: 23  8:17 PM   Result Value Ref Range    POC Glucose 167 (H) 70 - 99 mg/dl    Performed on ACCU-CHEK    POCT Glucose    Collection Time: 23 mobility to bathroom as KI not in place prior to shower. Noted drainage still from left lower back incision, more edema than last seen weds by this OT. Nursing notified and will place dressing on . Pt left in wheelchair. Activity Tolerance: Patient tolerated treatment well  Discharge Recommendations: Home with Home health OT, Home with assist PRN    Speech therapy:            PT  Position Activity Restriction  Spinal Precautions: No Bending, No Lifting, No Twisting  Spinal Precautions: Limited to lifting 5 lbs  Other position/activity restrictions: prn knee immobilizer for unstable R knee (using mostly in therapy). Objective     Sit to Stand: Contact guard assistance, Minimal Assistance (RW)  Stand to Sit: Contact guard assistance, Maximum Assistance, Minimal Assistance (RW)  Bed to Chair: Contact guard assistance, Minimal assistance (EOB <> recliner w/ RW)  Device: Rolling Walker  Assistance: Contact guard assistance  Distance: 20' w/ 2 turns within the gym, additional distances within unit for stairs and transfers  OT  PT Equipment Recommendations  Equipment Needed: Yes  Mobility Devices: Unk Lilly: Rolling     Assessment        SLP          Body mass index is 33.55 kg/m². Rehabilitation Diagnosis:   Orthopedic, 8.9, Other Orthopedic        Assessment and Plan:     Impairments: RLE>LLE weakness, sensory deficit, decreased balance, spine ROM     Lumbar DDD with bilateral radiculopathy  -s/p L2-4 TLIF (3/13 with Dr. Lizbet Christine)  -Wound care per Nsgy. Monitor ongoing serosanguinous drainage from left incision. -PT/OT     Acute blood loss anemia   -Post-surgical.   -Monitor Hgb, transfuse prn <7. HTN  -atenolol, lisinopril (decreased dose), holding amlodipine -- monitor trend, improving     HLD  -rosuvastatin     DM2 with hyperglycemia  -metformin, ISS -- monitor trend, improving     Gout  -allopurinol     Morbid Obesity   -Complicating assessment and treatment.  Placing patient at risk for multiple Kei Trejo)  Obstetrics and Gynecology  [address] [place] [address], [address]  [place] [address]  Phone: [phone]  Fax: [phone]  Follow Up Time:

## 2023-03-27 NOTE — PROGRESS NOTES
Pt continues to c/o R thigh, hip, leg, buttock pain. Not comfortable in the bed and requests to move to chair. Pt up from bed to chair with CGA and walker and GB. Heating pad used prn. Medicated with Tylenol per prn orders. Pt s/p TLIF L2-L4 on 3/13/23 at [facility]. Dressing to lower back CDI. Spinal precautions in place. Lungs clear. No sob or cough. On RA. Belly round and soft with active BS. LBM today. Continent of urine. No edema noted. Khang light in reach. Chair alarm in place.

## 2023-03-28 LAB
GLUCOSE BLD-MCNC: 111 MG/DL (ref 70–99)
GLUCOSE BLD-MCNC: 127 MG/DL (ref 70–99)
GLUCOSE BLD-MCNC: 144 MG/DL (ref 70–99)
GLUCOSE BLD-MCNC: 157 MG/DL (ref 70–99)
PERFORMED ON: ABNORMAL

## 2023-03-28 PROCEDURE — 97530 THERAPEUTIC ACTIVITIES: CPT

## 2023-03-28 PROCEDURE — 97110 THERAPEUTIC EXERCISES: CPT

## 2023-03-28 PROCEDURE — 97110 THERAPEUTIC EXERCISES: CPT | Performed by: PHYSICAL THERAPIST

## 2023-03-28 PROCEDURE — 6360000002 HC RX W HCPCS: Performed by: PHYSICAL MEDICINE & REHABILITATION

## 2023-03-28 PROCEDURE — 97530 THERAPEUTIC ACTIVITIES: CPT | Performed by: PHYSICAL THERAPIST

## 2023-03-28 PROCEDURE — 97116 GAIT TRAINING THERAPY: CPT | Performed by: PHYSICAL THERAPIST

## 2023-03-28 PROCEDURE — 1280000000 HC REHAB R&B

## 2023-03-28 PROCEDURE — 94760 N-INVAS EAR/PLS OXIMETRY 1: CPT

## 2023-03-28 PROCEDURE — 97535 SELF CARE MNGMENT TRAINING: CPT

## 2023-03-28 PROCEDURE — 6370000000 HC RX 637 (ALT 250 FOR IP): Performed by: PHYSICAL MEDICINE & REHABILITATION

## 2023-03-28 RX ADMIN — OXYCODONE HYDROCHLORIDE 10 MG: 10 TABLET ORAL at 22:24

## 2023-03-28 RX ADMIN — METHOCARBAMOL TABLETS 750 MG: 750 TABLET, COATED ORAL at 14:37

## 2023-03-28 RX ADMIN — ATENOLOL 50 MG: 50 TABLET ORAL at 08:08

## 2023-03-28 RX ADMIN — DICLOFENAC SODIUM 4 G: 10 GEL TOPICAL at 20:51

## 2023-03-28 RX ADMIN — METHOCARBAMOL TABLETS 750 MG: 750 TABLET, COATED ORAL at 17:06

## 2023-03-28 RX ADMIN — METHOCARBAMOL TABLETS 750 MG: 750 TABLET, COATED ORAL at 20:51

## 2023-03-28 RX ADMIN — DICLOFENAC SODIUM 4 G: 10 GEL TOPICAL at 14:38

## 2023-03-28 RX ADMIN — DICLOFENAC SODIUM 4 G: 10 GEL TOPICAL at 08:11

## 2023-03-28 RX ADMIN — HEPARIN SODIUM 5000 UNITS: 5000 INJECTION INTRAVENOUS; SUBCUTANEOUS at 20:51

## 2023-03-28 RX ADMIN — ROSUVASTATIN CALCIUM 5 MG: 10 TABLET, FILM COATED ORAL at 20:51

## 2023-03-28 RX ADMIN — LISINOPRIL 10 MG: 10 TABLET ORAL at 08:08

## 2023-03-28 RX ADMIN — METHOCARBAMOL TABLETS 750 MG: 750 TABLET, COATED ORAL at 08:08

## 2023-03-28 RX ADMIN — METFORMIN HYDROCHLORIDE 500 MG: 500 TABLET ORAL at 08:08

## 2023-03-28 RX ADMIN — ALLOPURINOL 300 MG: 300 TABLET ORAL at 08:08

## 2023-03-28 RX ADMIN — HEPARIN SODIUM 5000 UNITS: 5000 INJECTION INTRAVENOUS; SUBCUTANEOUS at 08:08

## 2023-03-28 RX ADMIN — METFORMIN HYDROCHLORIDE 500 MG: 500 TABLET ORAL at 17:06

## 2023-03-28 RX ADMIN — TAMSULOSIN HYDROCHLORIDE 0.8 MG: 0.4 CAPSULE ORAL at 08:08

## 2023-03-28 ASSESSMENT — PAIN DESCRIPTION - LOCATION: LOCATION: HIP;LEG

## 2023-03-28 ASSESSMENT — PAIN SCALES - GENERAL
PAINLEVEL_OUTOF10: 0
PAINLEVEL_OUTOF10: 0
PAINLEVEL_OUTOF10: 10
PAINLEVEL_OUTOF10: 0
PAINLEVEL_OUTOF10: 0

## 2023-03-28 ASSESSMENT — PAIN DESCRIPTION - ORIENTATION: ORIENTATION: RIGHT

## 2023-03-28 ASSESSMENT — PAIN DESCRIPTION - DESCRIPTORS: DESCRIPTORS: ACHING;DISCOMFORT;SORE

## 2023-03-28 NOTE — PROGRESS NOTES
to lifting 5 lbs  Other position/activity restrictions: prn knee immobilizer for unstable R knee (using mostly in therapy). Equipment Used: Wheelchair, Bed    Subjective  Subjective: Pt met in dept. Pt w/o complaints. Pt not wearing knee immoblizer, stating able to stabilize his knee better. Restrictions/Precautions: Fall Risk;Surgical Protocols        Objective     Cognition  Overall Cognitive Status: WFL         ADL  Grooming/Oral Hygiene  Assistance Level: Supervision  Skilled Clinical Factors: stance at sink to wash hands after having BM on commode. Pt slightly leaning against walker at sink. Putting On/Taking Off Footwear  Assistance Level: Supervision  Skilled Clinical Factors: Practiced doffing/donning socks and shoes, crossing LE's. Slightly more effort to cross LLE,yet able to complete. Toileting  Assistance Level: Stand by assist  Skilled Clinical Factors: Pt voided BM, able to reach w/R arm, to buttocks w/o twistng at waist. Pt managed clothes down/up over hips. Pt used grab bar for steadying self. Toilet Transfers  Technique:  (amb)  Equipment: Grab bars  Assistance Level: Stand by assist  Skilled Clinical Factors: RW amb to/from BR. Pt reports he can use his BSC over toilet at home. Instrumental ADL's  Instrumental ADLs: Yes  Meal Prep  Meal Prep Level:  (RW)  Meal Prep Level of Assistance: Stand by assistance;Contact guard assistance  Meal Preparation: Made egg on stove top. Pt gathered items using walker basket. Pt able to manage controls of oven, and safely place cooked egg into small container to carry to table in walker basket. Pt reports he will eat in the kitchen at a table, which is close to counters. Functional Mobility  Device: Rolling walker  Activity: To/From bathroom  Assistance Level: Stand by assist;Contact guard assist  Skilled Clinical Factors: No KI this date. Pt using walker basket this date to gather items in kitchen for cooking task.  Pt reports he may use a longaberger type basket on the walker, yet states wife can carry items and the counters in kitchen are close to reach to table. Pt tolerating up for 9-10 min, with occasional LLE unsteadiness, yet no LOB, pt able to control knee/LE. Pt requesting to use BR upon return to room. Pt amb from w/c to/from BR, 20 ft. Transfers  Surface: Wheelchair; To chair with arms;From chair with arms  Device:  (RW)  Sit to Stand  Assistance Level: Stand by assist  Stand to Sit  Assistance Level: Stand by assist   OT Exercises  Resistive Exercises: 4lb wt ex's, 15 reps for shoulder, elbow, forearm motions. Assessment  Assessment  Assessment: Pt tolerated session well, and is making progress towared goals. Pt completing functional transfers SBA. Pt completing kitchen mob, cooking tasks, close SBA/light CGA, no KI on. Pt tolerating up for 9-10. Pt demonstrated completing making egg on stove top safely, managing stove controls, and using basket on walker to gather, carry items between counter,stove, table. Pt reporting wife able to assist carrying items and that table in kitchen is close to counters to reach to. Pt completing toileting at end of tx, SBA, managing hygiene needs, regarding back precaution. Pt SBA for toilet transfer on comfort ht commode and stated he has a BSC, \"that I can put over my toilet\". Cont poc with d/c planned for Friday, home with assist prn and HHOT. Activity Tolerance: Patient limited by endurance; Patient limited by pain; Patient tolerated treatment well (hip pain at end of tx.)  Discharge Recommendations: Home with Home health OT; Home with assist PRN  OT Equipment Recommendations  ADL Assistive Devices:  (? needing TSF if has [facility] to use over toilet.)  Other: He owns a reacher. Reporting today having a BSC that he can use over his toilet. Safety Devices  Safety Devices in place: Yes  Type of devices: Gait belt;Call light within reach; Chair alarm in place; Left in chair    Patient Education  Education  Education

## 2023-03-28 NOTE — PROGRESS NOTES
Pt awake and AAO lying in bed requesting to move over to the chair. Pt c/o 6/10 pain to R hip. Medicated with Roxicodone 5mg per prn orders. Pt reports that the pain goes from hip area down buttock and into thigh. No redness or warmth noted. No swelling noted. Pt feels more comfortable standing and sitting. Pt s/p TLIF L2-L4 on 3/13/23 with Dr. Dillan Tuttle at [facility] AT [facility]. Dressing to low back CDI. Lungs clear. No sob or cough. On RA. Belly round and soft with active BS. LBM this evening. Continent of urine. No edema noted.

## 2023-03-28 NOTE — PLAN OF CARE
Problem: Discharge Planning  Goal: Discharge to home or other facility with appropriate resources  Outcome: Progressing     Problem: Skin/Tissue Integrity  Goal: Absence of new skin breakdown  Description: 1. Monitor for areas of redness and/or skin breakdown  2. Assess vascular access sites hourly  3. Every 4-6 hours minimum:  Change oxygen saturation probe site  4. Every 4-6 hours:  If on nasal continuous positive airway pressure, respiratory therapy assess nares and determine need for appliance change or resting period. Outcome: Progressing     Problem: Safety - Adult  Goal: Free from fall injury  Outcome: Progressing  Note: Patient will be free of injury this shift. Patient is encouraged to call for assistance prior to getting out of bed. Call light within reach, bed in lowest position and locked, and bed alarm engaged. Non-slip socks on both feet. Bedside table within reach. Room and floor are free of clutter.         Problem: ABCDS Injury Assessment  Goal: Absence of physical injury  Outcome: Progressing  Flowsheets (Taken 3/28/2023 1009)  Absence of Physical Injury: Implement safety measures based on patient assessment     Problem: Pain  Goal: Verbalizes/displays adequate comfort level or baseline comfort level  Outcome: Progressing     Problem: Neurosensory - Adult  Goal: Achieves stable or improved neurological status  Outcome: Progressing     Problem: Respiratory - Adult  Goal: Achieves optimal ventilation and oxygenation  Recent Flowsheet Documentation  Taken 3/28/2023 5570 by Suzanne Loera RN  Achieves optimal ventilation and oxygenation: Assess for changes in respiratory status     Problem: Skin/Tissue Integrity - Adult  Goal: Incisions, wounds, or drain sites healing without S/S of infection  Recent Flowsheet Documentation  Taken 3/28/2023 1009 by Suzanne Loera RN  Incisions, Wounds, or Drain Sites Healing Without Sign and Symptoms of Infection: TWICE DAILY: Assess and document skin

## 2023-03-28 NOTE — PROGRESS NOTES
OCCUPATIONAL THERAPY  Progress Note   Second Session    Patient Name: Zamzam Goodwin  Medical Record Number: [id number]    Treatment Diagnosis: Impaired functional mobility    General  Chart Reviewed: Yes, Orders, Progress Notes, History and Physical, Previous Admission  Patient assessed for rehabilitation services?: Yes  Additional Pertinent Hx: Patient is a 79 yo M with pmh HTN, HLD, DM2, obesity, gout, and lumbar DDD with radiculopathy (s/p prior L3-S1 laminectomy and L4-S1 fusion with Dr. Lisette Mcginnis) who initially presented to [address] [place] [address] on 3/13/2023 for planned spine surgery. He has history of severe back and bilateral leg pain. MRI showed evidence of L2-3, L3-4 DDD, spondylosis, and stenosis. He did not respond to conservative management. Therefore decision was made to undergo L2-L4 TLIF (3/13 with Dr. Lucrecia Orosco). Post op course notable for acute hypoxic respiratory failure (requiring 3L O2), hyperglycemia, anemia, and urinary retention. Now presents to ARU with impaired mobility and self-care below his baseline. Currently, patient reports moderate low back pain with radiation into bilateral buttocks and intermittently down right thigh. Worse with movement. Improves with rest. He has baseline tingling/numbness in right lateral lower leg and soles of feet. Since surgery he has felt generally weak and has also had intermittent right leg buckling. He is motivated to start inpatient rehab program.  (copied per note Dr Sydnee Peace 3/20/23)  Family / Caregiver Present: Yes (wife)  Referring Practitioner: Sydnee Peace  Diagnosis: Lumbar disc disease with radiculopathy     Restrictions/Precautions  Restrictions/Precautions: Fall Risk, Surgical Protocols        Position Activity Restriction  Spinal Precautions: No Bending, No Lifting, No Twisting  Spinal Precautions: Limited to lifting 5 lbs  Other position/activity restrictions: prn knee immobilizer for unstable R knee (using mostly in therapy).     Subjective: pt met bedside, agreeable to OT    Objective:  Pt stood from recliner, amb to bathroom with SBA. Stood at [employer], urinated in commode, managed clothing indep. Amb to sink to wash hands with SBA, no buckling of knees today. Transported to dept. Discussed using bedside commode over toielt vs toilet safety frames. He will see if bedside commode fits in the space over his toilet. Wife brought in heavy duty rollator - discussed that 2 wh walker will be more supportive for him at home    UE ex - 4 lb dumbbell to improve activity tolerance for ADL/mobility. Shoulder flex/ext, horizontal abd/add, elbow flex/ext, sup/pron, wrist flex/ext  Assessment: Pt cont to improve with mobiltiy/transfers with only SBA< no knee buckling this week without KI    Mobility - Pt amb with RW, able to gather clothing from closet, used front of walker to carry without cues.   AMb to recliner, then returned to closet with good safety awareness    Safety Device - Type of devices:  []  All fall risk precautions in place [] Bed alarm in place  [] Call light within reach [] Chair alarm in place [] Positioning belt [x] Gait belt [] Patient at risk for falls [] Left in bed [] Left in chair [] Telesitter in use [] Sitter present [] Nurse notified []  None    To PT after OT  Therapy Time   Individual Co-treatment   Time In 1300     Time Out 1345     Minutes 45       Electronically signed by Russell Sultana OT on 3/28/2023 at 1:20 PM

## 2023-03-28 NOTE — PROGRESS NOTES
Patient admitted to rehab with lumbar disc disease with radiculopathy. A/Ox4. Transfers with no device, ambulates with RW. Mobility restrictions: WBAT. On regular 5 carb diet, tolerating well. Medications taken whole. On Heparin for DVT prophylaxis. Skin: surgical incision to lower back. Oxygen: room air. LDA: none. Has been continent of bowel and  bladder. LBM 3/27. Chair/bed alarms in use and call light in reach. Will monitor for safety.  Electronically signed by Ranjan Mahoney RN, [address] [place] [address] on 3/28/23 at 11:27 AM EDT

## 2023-03-28 NOTE — PROGRESS NOTES
Dressing  Assistance Level: Stand by assist  Skilled Clinical Factors: able to thread briefs and pants over feet, reacher as needed, SBA for balance - pants over hips per self with grab bar for balance  Putting On/Taking Off Footwear  Assistance Level: Supervision  Skilled Clinical Factors: Practiced doffing/donning socks and shoes, crossing LE's. Slightly more effort to cross LLE,yet able to complete. Toileting  Assistance Level: Stand by assist  Skilled Clinical Factors: Pt voided BM, able to reach w/R arm, to buttocks w/o twistng at waist. Pt managed clothes down/up over hips. Pt used grab bar for steadying self. Assessment:  Assessment: Pt tolerated session well, and is making progress towared goals. Pt completing functional transfers SBA. Pt completing kitchen mob, cooking tasks, close SBA/light CGA, no KI on. Pt tolerating up for 9-10. Pt demonstrated completing making egg on stove top safely, managing stove controls, and using basket on walker to gather, carry items between counter,stove, table. Pt reporting wife able to assist carrying items and that table in kitchen is close to counters to reach to. Pt completing toileting at end of tx, SBA, managing hygiene needs, regarding back precaution. Pt SBA for toilet transfer on comfort ht commode and stated he has a BSC, \"that I can put over my toilet\". Cont poc with d/c planned for Friday, home with assist prn and HHOT. Activity Tolerance: Patient limited by endurance, Patient limited by pain, Patient tolerated treatment well (hip pain at end of tx.)  Discharge Recommendations: Home with Home health OT, Home with assist PRN    Speech therapy:            PT  Position Activity Restriction  Spinal Precautions: No Bending, No Lifting, No Twisting  Spinal Precautions: Limited to lifting 5 lbs  Other position/activity restrictions: prn knee immobilizer for unstable R knee (using mostly in therapy).   Objective     Sit to Stand: Contact guard assistance, Minimal Assistance (RW)  Stand to Sit: Contact guard assistance, Maximum Assistance, Minimal Assistance (RW)  Bed to Chair: Contact guard assistance, Minimal assistance (EOB <> recliner w/ RW)  Device: Rolling Walker  Assistance: Contact guard assistance  Distance: 20' w/ 2 turns within the gym, additional distances within unit for stairs and transfers  OT  PT Equipment Recommendations  Equipment Needed: Yes  Mobility Devices: Benjaman Inch: Rolling     Assessment        SLP          Body mass index is 33.86 kg/m². Rehabilitation Diagnosis:   Orthopedic, 8.9, Other Orthopedic        Assessment and Plan:     Impairments: RLE>LLE weakness, sensory deficit, decreased balance, spine ROM     Lumbar DDD with bilateral radiculopathy  -s/p L2-4 TLIF (3/13 with Dr. Lorelei Rutherford)  -Wound care per Nsgy. Monitor ongoing serosanguinous drainage from left incision. -PT/OT     Acute blood loss anemia   -Post-surgical.   -Monitor Hgb, transfuse prn <7. HTN  -atenolol, lisinopril (decreased dose), holding amlodipine -- monitor trend, improving     HLD  -rosuvastatin     DM2 with hyperglycemia  -metformin, ISS -- monitor trend, improving     Gout  -allopurinol     Morbid Obesity   -Complicating assessment and treatment. Placing patient at risk for multiple co-morbidities as well as early death and contributing to the patient's presentation.   -Dietician consult. Counseling and education. Bladder: Urinary retention, h/o BPH  -Jackson removed for void trial (3/20)   -Monitor PVRs, SC prn >500cc -- Voiding without need for straight cath. -Flomax     Bowel   -High risk constipation   -senna+colace QOD, PRN miralax, MoM, and bisacodyl supp. Safety   -fall precautions     Pain control  -methocarbamol, prn oxycodone     DVT ppx  -Heparin BID per Nsgy    Rehab Progress: Making progress. Working on functional mobility, balance, strength, compensatory strategies. Barriers include: R quad weakness, spine precautions, urinary retention.

## 2023-03-28 NOTE — PROGRESS NOTES
Physical Therapy  Facility/Department: [facility]  Rehabilitation Physical Therapy Treatment Note    NAME: Meagan Long  : 1945 (78 y.o.)  MRN: [id number]  CODE STATUS: Full Code    Date of Service: 3/28/23       Restrictions:  Restrictions/Precautions: Fall Risk, Surgical Protocols  Position Activity Restriction  Spinal Precautions: No Bending, No Lifting, No Twisting  Spinal Precautions: Limited to lifting 5 lbs  Other position/activity restrictions: prn knee immobilizer for unstable R knee (using mostly in therapy). SUBJECTIVE  Subjective  Subjective: Patient states that he was able to sleep more last night and that the Voltaren cream and acetaminophen have helped decrease the R hip pain. Pain: R hip 0/10 at rest    OBJECTIVE  Cognition  Overall Cognitive Status: WFL  Orientation  Overall Orientation Status: Within Functional Limits    Functional Mobility  Balance  Sitting Balance: Stand by assistance  Standing Balance: Stand by assistance  Transfers  Surface: Wheelchair; To chair with arms  Additional Factors: Set-up; Increased time to complete;Verbal cues  Device: Walker (RW)  Sit to Stand  Assistance Level: Stand by assist  Stand to Sit  Assistance Level: Stand by assist      Environmental Mobility  Ambulation  Surface: Level surface  Device: Rolling walker  Distance: 160' with 2 turns, additional distance to access NuStep  Activity: Within Unit  Activity Comments: w/o RLE immobilizing brace; no standing rest break or w/c follow  Additional Factors: Set-up; Verbal cues; Increased time to complete  Assistance Level: Contact guard assist  Gait Deviations: Slow halley;Decreased step length bilateral;Decreased heel strike right;Decreased heel strike left  Skilled Clinical Factors: CGA x 1 for safety. Demonstrates occasional R knee buckle w/ L knee bend moment but able to maintain standing balance, has increased lean with RUE on walker for compensation.  Decreased VC required to improve R heel strike and extend knee before R stance phase. Improved stability w/ turns     PT Exercises  Exercise Treatment: NuStep for 10 mins to improve quad strength and hip/knee mobility (LE only) while maintaining spinal precautions - seated level 10, resistance at 3. Assist required for set up and to reposition feet d/t decreased RLE strength  A/AROM Exercises: Seated: marches, LAQ (RLE AAROM), ankle pumps, ADD ball squeeze x 20  Exercise Equipment: NuStep    PM Session  Ambulation  Surface: Level surface  Device: Rolling walker  Distance: 160' with 2 turns, additional distance to access curb step and mat table  Activity: Within Unit  Activity Comments: w/o RLE immobilizing brace; no standing rest break or w/c follow  Additional Factors: Set-up; Verbal cues; Increased time to complete  Assistance Level: Contact guard assist  Gait Deviations: Slow halley;Decreased step length bilateral;Decreased heel strike right;Decreased heel strike left  Skilled Clinical Factors: CGA x 1 for safety. Demonstrates occasional R knee buckle w/ L knee bend moment but able to maintain standing balance, has increased lean with RUE on walker for compensation. Decreased VC required to improve R heel strike and extend knee before R stance phase. Improved stability w/ turns  Curb  Curb Height: 6''  Device: Rolling walker  Number of Curbs: 1  Additional Factors: Set-up; Verbal cues; Increased time to complete  Assistance Level: Contact guard assist  Skilled Clinical Factors: Pt w/ occasional R knee buckling but no LOB.  No VC for sequencing or RW management    Functional Mobility  Roll Right  Assistance Level: Supervision  Skilled Clinical Factors: R hip pain decreases on R side  Sit to Supine  Assistance Level: Stand by assist  Skilled Clinical Factors: Able to lift BLE onto mat table and maintain spinal precautions  Supine to Sit  Assistance Level: Stand by assist  Skilled Clinical Factors: Able to log roll w/o VC; improved ability to push up with BUE

## 2023-03-29 LAB
GLUCOSE BLD-MCNC: 110 MG/DL (ref 70–99)
GLUCOSE BLD-MCNC: 131 MG/DL (ref 70–99)
GLUCOSE BLD-MCNC: 140 MG/DL (ref 70–99)
GLUCOSE BLD-MCNC: 145 MG/DL (ref 70–99)
PERFORMED ON: ABNORMAL

## 2023-03-29 PROCEDURE — 97535 SELF CARE MNGMENT TRAINING: CPT

## 2023-03-29 PROCEDURE — 1280000000 HC REHAB R&B

## 2023-03-29 PROCEDURE — 97530 THERAPEUTIC ACTIVITIES: CPT | Performed by: PHYSICAL THERAPIST

## 2023-03-29 PROCEDURE — 6370000000 HC RX 637 (ALT 250 FOR IP): Performed by: PHYSICAL MEDICINE & REHABILITATION

## 2023-03-29 PROCEDURE — 94760 N-INVAS EAR/PLS OXIMETRY 1: CPT

## 2023-03-29 PROCEDURE — 6360000002 HC RX W HCPCS: Performed by: PHYSICAL MEDICINE & REHABILITATION

## 2023-03-29 PROCEDURE — 97116 GAIT TRAINING THERAPY: CPT | Performed by: PHYSICAL THERAPIST

## 2023-03-29 PROCEDURE — 97110 THERAPEUTIC EXERCISES: CPT | Performed by: PHYSICAL THERAPIST

## 2023-03-29 PROCEDURE — 97530 THERAPEUTIC ACTIVITIES: CPT

## 2023-03-29 RX ADMIN — DICLOFENAC SODIUM 4 G: 10 GEL TOPICAL at 21:42

## 2023-03-29 RX ADMIN — DICLOFENAC SODIUM 4 G: 10 GEL TOPICAL at 15:01

## 2023-03-29 RX ADMIN — METFORMIN HYDROCHLORIDE 500 MG: 500 TABLET ORAL at 07:34

## 2023-03-29 RX ADMIN — HEPARIN SODIUM 5000 UNITS: 5000 INJECTION INTRAVENOUS; SUBCUTANEOUS at 07:34

## 2023-03-29 RX ADMIN — ROSUVASTATIN CALCIUM 5 MG: 10 TABLET, FILM COATED ORAL at 21:41

## 2023-03-29 RX ADMIN — SENNOSIDES AND DOCUSATE SODIUM 1 TABLET: 50; 8.6 TABLET ORAL at 07:33

## 2023-03-29 RX ADMIN — HEPARIN SODIUM 5000 UNITS: 5000 INJECTION INTRAVENOUS; SUBCUTANEOUS at 21:40

## 2023-03-29 RX ADMIN — DICLOFENAC SODIUM 4 G: 10 GEL TOPICAL at 09:03

## 2023-03-29 RX ADMIN — METHOCARBAMOL TABLETS 750 MG: 750 TABLET, COATED ORAL at 07:33

## 2023-03-29 RX ADMIN — TAMSULOSIN HYDROCHLORIDE 0.8 MG: 0.4 CAPSULE ORAL at 07:33

## 2023-03-29 RX ADMIN — ACETAMINOPHEN 650 MG: 325 TABLET ORAL at 06:17

## 2023-03-29 RX ADMIN — ALLOPURINOL 300 MG: 300 TABLET ORAL at 07:34

## 2023-03-29 RX ADMIN — METHOCARBAMOL TABLETS 750 MG: 750 TABLET, COATED ORAL at 12:49

## 2023-03-29 RX ADMIN — ATENOLOL 50 MG: 50 TABLET ORAL at 07:34

## 2023-03-29 RX ADMIN — LISINOPRIL 10 MG: 10 TABLET ORAL at 07:33

## 2023-03-29 RX ADMIN — METHOCARBAMOL TABLETS 750 MG: 750 TABLET, COATED ORAL at 21:42

## 2023-03-29 RX ADMIN — Medication 3 MG: at 21:41

## 2023-03-29 RX ADMIN — ACETAMINOPHEN 650 MG: 325 TABLET ORAL at 15:00

## 2023-03-29 RX ADMIN — METFORMIN HYDROCHLORIDE 500 MG: 500 TABLET ORAL at 17:01

## 2023-03-29 RX ADMIN — METHOCARBAMOL TABLETS 750 MG: 750 TABLET, COATED ORAL at 17:01

## 2023-03-29 ASSESSMENT — PAIN DESCRIPTION - ONSET: ONSET: ON-GOING

## 2023-03-29 ASSESSMENT — PAIN DESCRIPTION - DESCRIPTORS
DESCRIPTORS: SORE
DESCRIPTORS: DULL

## 2023-03-29 ASSESSMENT — PAIN SCALES - GENERAL
PAINLEVEL_OUTOF10: 0
PAINLEVEL_OUTOF10: 0
PAINLEVEL_OUTOF10: 3
PAINLEVEL_OUTOF10: 0
PAINLEVEL_OUTOF10: 3

## 2023-03-29 ASSESSMENT — PAIN DESCRIPTION - ORIENTATION
ORIENTATION: RIGHT
ORIENTATION: RIGHT

## 2023-03-29 ASSESSMENT — PAIN DESCRIPTION - LOCATION
LOCATION: HIP
LOCATION: HIP;LEG

## 2023-03-29 ASSESSMENT — PAIN DESCRIPTION - PAIN TYPE: TYPE: CHRONIC PAIN

## 2023-03-29 ASSESSMENT — PAIN SCALES - WONG BAKER: WONGBAKER_NUMERICALRESPONSE: 0

## 2023-03-29 ASSESSMENT — PAIN - FUNCTIONAL ASSESSMENT: PAIN_FUNCTIONAL_ASSESSMENT: ACTIVITIES ARE NOT PREVENTED

## 2023-03-29 ASSESSMENT — PAIN DESCRIPTION - FREQUENCY: FREQUENCY: CONTINUOUS

## 2023-03-29 NOTE — PATIENT CARE CONFERENCE
[facility]  Weekly Team Conference Note      Date: 3/30/2023  Patient Name:  Gonzalez Pyle    MRN: [id number]  : 1945  Gender:   Physician:   Diagnosis: Lumbar disc disease with radiculopathy [M51.16]    CASE MANAGEMENT  Assessment: goal is home      PHYSICAL THERAPY    Bed Mobility:  Overall Assistance Level: Supervision  Additional Factors: Increased time to complete, Head of bed flat, Without handrails  Sit>supine:  Assistance Level: Supervision  Skilled Clinical Factors: Able to lift BLE onto mat table and maintain spinal precautions  Supine>sit:  Assistance Level: Supervision  Skilled Clinical Factors: Able to log roll and slide BLE off EOB; improved ability to push up with BUE to come to full sit on EOB    Transfers:  Surface: Wheelchair, To mat, From mat  Additional Factors: Set-up, Increased time to complete, Without handrails  Device: Walker (RW)  Sit>stand:  Assistance Level: Stand by assist  Skilled Clinical Factors: for safety  Stand>sit:  Assistance Level: Stand by assist  Skilled Clinical Factors: for safety  Bed<>chair  Technique: Stand step  Assistance Level: Stand by assist  Skilled Clinical Factors: mat table to w/c w/ RW  Stand Pivot:     Lateral transfer:     Car transfer:  Assistance Level: Stand by assist  Skilled Clinical Factors: SBA x 1 for safety, one VC for handplacement to prevent using door to push up into stand    Ambulation:  Surface: Level surface  Device: Rolling walker  Distance: 220' x 2 with 2 turns (CGA x 1), 20' w/o turn (SBA x 1)  Activity: Within Unit  Activity Comments: w/o RLE immobilizing brace; 1 standing rest break and w/c follow for community distances.   Additional Factors: Set-up, Verbal cues, Increased time to complete  Assistance Level: Contact guard assist, Stand by assist  Gait Deviations: Slow halley, Decreased step length bilateral, Decreased heel strike right, Decreased heel strike left  Skilled Clinical Factors: SBA x 1 w/ household distance, CGA x 1 for safety w/ community distances. Demonstrates occasional R knee buckle w/ L knee bend moment but able to maintain standing balance, has increased lean with RUE on walker for compensation. Decreased VC required to improve R heel strike and extend knee before R stance phase. Improved stability w/ turns    Curb:  Curb Height: 6''  Device: Rolling walker  Number of Curbs: 1  Additional Factors: Increased time to complete  Assistance Level: Contact guard assist  Skilled Clinical Factors: Pt w/ occasional R knee buckling but no LOB. Improved stability with turns. No VC for walker management    Assessment:  Assessment: Patient is a 77 yo M with pmh HTN, HLD, DM2, obesity, gout, and lumbar DDD with radiculopathy (s/p prior L3-S1 laminectomy and L4-S1 fusion) presents to ARU below baseline function s/p L2-4 lumbar fusion on 3/13/23. Pt PLOF is Indep for functional mobility and ambulation without assistive device. Currently, pt is supervision x 1 for bed mobility, SBA x 1 for sit <> stand transfers and ambulating household distance w/ RW, and CGA x 1 for ambulating community distances and navigating curb step w/ RW. Pt demonstrates occasional R knee buckling w/o immobilzing brace on RLE but improved stability w/ VC to improve heel strike and knee extension. Pt still has RLE quad weakness as evidenced by inability to achieve full knee extension w/ LAQ in sitting. Pt endurance improving as evidenced by ambulating community distance x 2 this morning. Pt would benefit from short stay at [facility] to increase strength, balance, endurance, functional mobility, and decrease risk of falls in order to return home safely.   Activity Tolerance: Patient tolerated treatment well  Discharge Recommendations: Continue to assess pending progress, Home with Home health PT, Patient would benefit from continued therapy after discharge        SPEECH THERAPY (intentionally left blank if not actively being

## 2023-03-29 NOTE — PROGRESS NOTES
Pt awake and AAO sitting up in bed c/o 10/10 R LE pain. Medicated with Roxicodone 10 mg per prn orders. Pt admitted to ARU after TLIF L2-L4. Spinal precautions in place. SI well-approximated with surgical glue. Dressing removed and new DSD applied. Lungs clear. No sob or cough. On RA. Belly round and soft with active BS. LBM yesterday. Continent B and B. No edema noted. Khang light in reach. Bed alarm on.

## 2023-03-29 NOTE — PROGRESS NOTES
grab bar for balance - pants over hips per self with grab bar for balance  Putting On/Taking Off Footwear  Assistance Level: Independent  Skilled Clinical Factors: able to cross legs to reach feet for socks and shoes  Toileting  Assistance Level: Modified independent  Skilled Clinical Factors: stood at commode to urinate indep  Tub/Shower Transfers  Type: Shower  Transfer From: Rolling walker  Transfer To: Shower chair with back  Assistance Level: Modified independent          Functional Mobility  Device: Rolling walker  Activity: To/From bathroom; Retrieve items;Transport items  Assistance Level: Modified independent  Skilled Clinical Factors: Stood from recliner, amb to/from bathroom, carried clothing to bathroom over front of walker - keeps clothes in bag to side of chair  Transfers  Surface: To chair with arms;From chair with arms  Sit to Stand  Assistance Level: Modified independent  Stand to Sit  Assistance Level: Modified independent         PM session  Pt met in dept, agreeable to OT  Pt able to propel wheelchair to ADL dept, due to not wanting to walk all the way to kitchen. Pt stood from wheelchair to walker, amb to kitchen for mobiltiy task. He stated he will not be doing much cooking, but does plan to complete simple tasks. He was able to retrieve juice from refrigerator, cues to maintain back precautions, no twisting/turning during even lightweight tasks  Used walker bag to carry. Able to retrieve cup from upper cabinet, fill with water place to counter. Demonstrated counter pass method and also proper body mechanics while turning to place cup to table 3 ft from counter. Pt stated he has table close enough to counter to reach safetly. Shower stall - pt did not recall proper method to step backward to stall. He required demonstration and verbal cues as he completed task to step back with left, out with right LE.   Sat to shower chair with SBA as he did not have a good surface to support self due Recommendations: Strengthening;Balance training;Functional mobility training; Endurance training; Safety education & training;Equipment evaluation, education, & procurement;Patient/Caregiver education & training;Self-Care / ADL; Home management training    Goals  Patient Goals   Patient goals : \"I want to be able to walk around, get to the bathroom, do most of my bathing and dressing  -- my wife can do my socks\"  Short Term Goals  Time Frame for Short Term Goals: 1 week pt will. .  Short Term Goal 1: bathe with min assist and AD Goal Met  Short Term Goal 2: dress UB with set up, LB with min assist and AD Goal Met  Short Term Goal 3: toilet with min assist and grab bars Goal Met  Short Term Goal 4: transfers and functional mobility with CGA and AD Goal Met  Short Term Goal 5: increase activity tolerance to stand 3 min for ADL/IADL tasks Goal Met  Long Term Goals  Time Frame for Long Term Goals : 10-14 days pt will. .  Long Term Goal 1: bathe indep with AD Goal Met  Long Term Goal 2: dress UB and LB indep with AD as needed Goal Met  Long Term Goal 3: toilet indep with grab bar for safety Goal Met  Long Term Goal 4: transfers and functional mobiltiy indep with AD, simple meal prep indep Goal Met  Long Term Goal 5: assess for DME Goal Met                   Therapy Time   Individual Concurrent Group Co-treatment   Time In 0815         Time Out 0910         Minutes 55         Timed Code Treatment Minutes: 55 Minutes   Therapy Time     Individual Co-treatment   Time In [facility]     Time Out [place]     Minutes 939 Kimber Brown OTR/L 21

## 2023-03-29 NOTE — PROGRESS NOTES
VC for walker management         PT Exercises  Exercise Treatment: Left sidelying: R hip flexion, knee extension x 10 each. Supine: Heel slides 2 x 10    PM Session  Care transferred over to PT w/ patient standing w/ RW at bathroom sink. SBA x 1 for 5-6 steps w/ RW to w/c in the room. Evaluated and practiced stairs in the gym (see above in bold). Sit <> stand transfers throughout session at SBA x 1 for safety. Seated rest breaks required between each activity due to fatigue. Amb 20' x 2 w/ 2 turns between w/c <> // bars at close SBA x 1. PT Exercises:   Standing in // bars - B x 10 each: toe raises, heel raises, hip ABD, hip Ext, mini squats. Lateral stepping along // bars x 2 laps. One seated rest break during standing exercises due to RLE fatigue and R hip pain. C/o increased pain w/ stand to sit into chair with increased hip flexion. Pt transferred to OT Josefina at end of session. ASSESSMENT/PROGRESS TOWARDS GOALS  Assessment  Assessment: Patient is a 77 yo M with pmh HTN, HLD, DM2, obesity, gout, and lumbar DDD with radiculopathy (s/p prior L3-S1 laminectomy and L4-S1 fusion) presents to ARU below baseline function s/p L2-4 lumbar fusion on 3/13/23. Pt PLOF is Indep for functional mobility and ambulation without assistive device. Currently, pt is supervision x 1 for bed mobility, SBA x 1 for sit <> stand transfers and ambulating household distance w/ RW, and CGA x 1 for ambulating community distances and navigating curb step w/ RW. Pt demonstrates occasional R knee buckling w/o immobilzing brace on RLE but improved stability w/ VC to improve heel strike and knee extension. Pt still has RLE quad weakness as evidenced by inability to achieve full knee extension w/ LAQ in sitting. Pt endurance improving as evidenced by ambulating community distance x 2 this morning.  Pt would benefit from short stay at ARU to increase strength, balance, endurance, functional mobility, and decrease risk of falls in order to understanding;Continued education needed        Therapy Time   Individual Concurrent Group Co-treatment   Time In 1115         Time Out 1200         Minutes 39           Second Session Therapy Time     Individual Co-treatment   Time In 1300     Time Out 1345     Minutes 39                PARRIS SORIANO [facility]  Therapist was present, directed the patient's care, made skilled judgement, and was responsible for assessment and treatment of the patient.         Leah Hirsch, 03/29/23 at 2:02 PM    Adrienne Suh, [address]

## 2023-03-29 NOTE — PROGRESS NOTES
Comprehensive Nutrition Assessment    Type and Reason for Visit:  Reassess    Nutrition Recommendations/Plan:   Continue current diet     Malnutrition Assessment:  Malnutrition Status:  No malnutrition (03/29/23 0855)    Context:  Acute Illness       Nutrition Assessment:    Follow-up. Remains on Regular, 5 Carb diet with PO intakes consistently % of meals. Noted last BM 3/16, bowel meds ordered. Continues with weight discrepancies in EMR; 3/28 222 lbs, 3/29 216 lbs. Continue monitoring weight trends and BMs. Nutrition Related Findings:    Labs reviewed. +5.6 L. Trace BLE edema. BM 3/26. Wound Type: Multiple, Surgical Incision       Current Nutrition Intake & Therapies:    Average Meal Intake: %  Average Supplements Intake: None Ordered  ADULT DIET; Regular; 5 carb choices (75 gm/meal)    Anthropometric Measures:  Height: 5' 8\" (172.7 cm)  Ideal Body Weight (IBW): 154 lbs (70 kg)       Current Body Weight: 216 lb (98 kg), 146.8 % IBW. Weight Source: Standing Scale  Current BMI (kg/m2): 32.9        Weight Adjustment For: No Adjustment                 BMI Categories: Obese Class 1 (BMI 30.0-34. 9)    Nutrition Diagnosis:   No nutrition diagnosis at this time     Nutrition Interventions:   Food and/or Nutrient Delivery: Continue Current Diet  Nutrition Education/Counseling: No recommendation at this time  Coordination of Nutrition Care: Continue to monitor while inpatient       Goals:  Previous Goal Met: Goal(s) Achieved  Goals: PO intake 75% or greater       Nutrition Monitoring and Evaluation:   Behavioral-Environmental Outcomes: None Identified  Food/Nutrient Intake Outcomes: Food and Nutrient Intake  Physical Signs/Symptoms Outcomes: Biochemical Data, Nutrition Focused Physical Findings, Skin, Weight, Constipation, Fluid Status or Edema    Discharge Planning:    Continue current diet     Milli Tejada, [address] [place] [address],   Contact: [phone]

## 2023-03-29 NOTE — PROGRESS NOTES
Assistance Level: Supervision  Additional Factors: Increased time to complete, Head of bed flat, Without handrails  Sit>supine:  Assistance Level: Supervision  Skilled Clinical Factors: Able to lift BLE onto mat table and maintain spinal precautions  Supine>sit:  Assistance Level: Supervision  Skilled Clinical Factors: Able to log roll and slide BLE off EOB; improved ability to push up with BUE to come to full sit on EOB  Transfers:  Surface: Wheelchair, To mat, From mat  Additional Factors: Set-up, Increased time to complete, Without handrails  Device: Walker (RW)  Sit>stand:  Assistance Level: Stand by assist  Skilled Clinical Factors: for safety  Stand>sit:  Assistance Level: Stand by assist  Skilled Clinical Factors: for safety  Bed<>chair  Technique: Stand step  Assistance Level: Stand by assist  Skilled Clinical Factors: mat table to w/c w/ RW  Stand Pivot:     Lateral transfer:     Car transfer:  Assistance Level: Stand by assist  Skilled Clinical Factors: SBA x 1 for safety, one VC for handplacement to prevent using door to push up into stand  Ambulation:  Surface: Level surface  Device: Rolling walker  Distance: 220' x 2 with 2 turns (CGA x 1), 20' w/o turn (SBA x 1)  Activity: Within Unit  Activity Comments: w/o RLE immobilizing brace; 1 standing rest break and w/c follow for community distances. Additional Factors: Set-up, Verbal cues, Increased time to complete  Assistance Level: Contact guard assist, Stand by assist  Gait Deviations: Slow halley, Decreased step length bilateral, Decreased heel strike right, Decreased heel strike left  Skilled Clinical Factors: SBA x 1 w/ household distance, CGA x 1 for safety w/ community distances. Demonstrates occasional R knee buckle w/ L knee bend moment but able to maintain standing balance, has increased lean with RUE on walker for compensation. Decreased VC required to improve R heel strike and extend knee before R stance phase.  Improved stability w/ for straight cath. -Flomax     Bowel   -High risk constipation   -senna+colace QOD, PRN miralax, MoM, and bisacodyl supp. Safety   -fall precautions     Pain control  -methocarbamol, prn oxycodone     DVT ppx  -Heparin BID per Nsgy    Rehab Progress: Making progress. Working on functional mobility, balance, strength, compensatory strategies. Barriers include: R quad weakness, spine precautions, urinary retention. Anticipated Dispo: home  with spouse  Services: HH PT, OT, RN  DME: wheel kit for walker, TSF, TTB  ELOS: 3/31      [address]  Anjali Lester MD 3/29/2023, 2:33 PM

## 2023-03-29 NOTE — PROGRESS NOTES
Patient admitted to rehab with lumbar disc radiculopathy. A/Ox4. Transfers with no device, ambulates with RW. Mobility restrictions: WBAT. On regular 5 carb diet, tolerating well. Medications taken whole. On Heparin for DVT prophylaxis. Skin: surgical incision to lower back and right upper buttock remain intact. Oxygen: room air. LDA: none. Has been continent of bowel and  bladder. LBM 3/29. Chair/bed alarms in use and call light in reach. Will monitor for safety.  Electronically signed by Jenise Monge RN, [address] [place] [address] on 3/29/23 at 3:18 PM EDT

## 2023-03-29 NOTE — PLAN OF CARE
Flowsheet Documentation  Taken 3/29/2023 8284 by Jenise Monge RN  Incisions, Wounds, or Drain Sites Healing Without Sign and Symptoms of Infection: TWICE DAILY: Assess and document skin integrity  Taken 3/29/2023 0733 by Jenise Monge RN  Incisions, Wounds, or Drain Sites Healing Without Sign and Symptoms of Infection: TWICE DAILY: Assess and document skin integrity     Problem: Gastrointestinal - Adult  Goal: Minimal or absence of nausea and vomiting  Recent Flowsheet Documentation  Taken 3/29/2023 8839 by Jenise Monge RN  Minimal or absence of nausea and vomiting: Provide nonpharmacologic comfort measures as appropriate  Goal: Maintains or returns to baseline bowel function  Recent Flowsheet Documentation  Taken 3/29/2023 3057 by Jenise Monge RN  Maintains or returns to baseline bowel function: Assess bowel function     Problem: Genitourinary - Adult  Goal: Absence of urinary retention  Recent Flowsheet Documentation  Taken 3/29/2023 2553 by Jenise Monge RN  Absence of urinary retention: Assess patients ability to void and empty bladder

## 2023-03-30 LAB
ANION GAP SERPL CALCULATED.3IONS-SCNC: 13 MMOL/L (ref 3–16)
BASOPHILS # BLD: 0 K/UL (ref 0–0.2)
BASOPHILS NFR BLD: 0.6 %
BUN SERPL-MCNC: 17 MG/DL (ref 7–20)
CALCIUM SERPL-MCNC: 9.5 MG/DL (ref 8.3–10.6)
CHLORIDE SERPL-SCNC: 101 MMOL/L (ref 99–110)
CO2 SERPL-SCNC: 25 MMOL/L (ref 21–32)
CREAT SERPL-MCNC: 0.5 MG/DL (ref 0.8–1.3)
DEPRECATED RDW RBC AUTO: 15.6 % (ref 12.4–15.4)
EOSINOPHIL # BLD: 0.1 K/UL (ref 0–0.6)
EOSINOPHIL NFR BLD: 2.1 %
GFR SERPLBLD CREATININE-BSD FMLA CKD-EPI: >60 ML/MIN/{1.73_M2}
GLUCOSE BLD-MCNC: 112 MG/DL (ref 70–99)
GLUCOSE BLD-MCNC: 135 MG/DL (ref 70–99)
GLUCOSE BLD-MCNC: 149 MG/DL (ref 70–99)
GLUCOSE BLD-MCNC: 159 MG/DL (ref 70–99)
GLUCOSE SERPL-MCNC: 120 MG/DL (ref 70–99)
HCT VFR BLD AUTO: 36.1 % (ref 40.5–52.5)
HGB BLD-MCNC: 12 G/DL (ref 13.5–17.5)
LYMPHOCYTES # BLD: 1.9 K/UL (ref 1–5.1)
LYMPHOCYTES NFR BLD: 32.6 %
MCH RBC QN AUTO: 28.3 PG (ref 26–34)
MCHC RBC AUTO-ENTMCNC: 33.3 G/DL (ref 31–36)
MCV RBC AUTO: 84.9 FL (ref 80–100)
MONOCYTES # BLD: 0.4 K/UL (ref 0–1.3)
MONOCYTES NFR BLD: 6.7 %
NEUTROPHILS # BLD: 3.5 K/UL (ref 1.7–7.7)
NEUTROPHILS NFR BLD: 58 %
PERFORMED ON: ABNORMAL
PLATELET # BLD AUTO: 347 K/UL (ref 135–450)
PMV BLD AUTO: 6.5 FL (ref 5–10.5)
POTASSIUM SERPL-SCNC: 4.4 MMOL/L (ref 3.5–5.1)
RBC # BLD AUTO: 4.25 M/UL (ref 4.2–5.9)
SODIUM SERPL-SCNC: 139 MMOL/L (ref 136–145)
WBC # BLD AUTO: 6 K/UL (ref 4–11)

## 2023-03-30 PROCEDURE — 6370000000 HC RX 637 (ALT 250 FOR IP): Performed by: PHYSICAL MEDICINE & REHABILITATION

## 2023-03-30 PROCEDURE — 94760 N-INVAS EAR/PLS OXIMETRY 1: CPT

## 2023-03-30 PROCEDURE — 97530 THERAPEUTIC ACTIVITIES: CPT

## 2023-03-30 PROCEDURE — 85025 COMPLETE CBC W/AUTO DIFF WBC: CPT

## 2023-03-30 PROCEDURE — 80048 BASIC METABOLIC PNL TOTAL CA: CPT

## 2023-03-30 PROCEDURE — 97535 SELF CARE MNGMENT TRAINING: CPT

## 2023-03-30 PROCEDURE — 97530 THERAPEUTIC ACTIVITIES: CPT | Performed by: PHYSICAL THERAPIST

## 2023-03-30 PROCEDURE — 36415 COLL VENOUS BLD VENIPUNCTURE: CPT

## 2023-03-30 PROCEDURE — 97110 THERAPEUTIC EXERCISES: CPT | Performed by: PHYSICAL THERAPIST

## 2023-03-30 PROCEDURE — 1280000000 HC REHAB R&B

## 2023-03-30 PROCEDURE — 6360000002 HC RX W HCPCS: Performed by: PHYSICAL MEDICINE & REHABILITATION

## 2023-03-30 PROCEDURE — 97110 THERAPEUTIC EXERCISES: CPT

## 2023-03-30 PROCEDURE — 97116 GAIT TRAINING THERAPY: CPT | Performed by: PHYSICAL THERAPIST

## 2023-03-30 RX ORDER — LISINOPRIL 10 MG/1
10 TABLET ORAL DAILY
Qty: 30 TABLET | Refills: 0 | Status: SHIPPED | OUTPATIENT
Start: 2023-03-30

## 2023-03-30 RX ORDER — OXYCODONE HYDROCHLORIDE 5 MG/1
5-10 TABLET ORAL EVERY 8 HOURS PRN
Qty: 15 TABLET | Refills: 0 | Status: SHIPPED | OUTPATIENT
Start: 2023-03-30 | End: 2023-04-04

## 2023-03-30 RX ORDER — METHOCARBAMOL 750 MG/1
750 TABLET, FILM COATED ORAL EVERY 6 HOURS PRN
Qty: 40 TABLET | Refills: 0 | Status: SHIPPED | OUTPATIENT
Start: 2023-03-30 | End: 2023-04-09

## 2023-03-30 RX ORDER — TAMSULOSIN HYDROCHLORIDE 0.4 MG/1
0.8 CAPSULE ORAL DAILY
Qty: 60 CAPSULE | Refills: 0 | Status: SHIPPED | OUTPATIENT
Start: 2023-03-30

## 2023-03-30 RX ADMIN — ACETAMINOPHEN 650 MG: 325 TABLET ORAL at 16:06

## 2023-03-30 RX ADMIN — METHOCARBAMOL TABLETS 750 MG: 750 TABLET, COATED ORAL at 16:06

## 2023-03-30 RX ADMIN — LISINOPRIL 10 MG: 10 TABLET ORAL at 08:07

## 2023-03-30 RX ADMIN — DICLOFENAC SODIUM 4 G: 10 GEL TOPICAL at 20:07

## 2023-03-30 RX ADMIN — ROSUVASTATIN CALCIUM 5 MG: 10 TABLET, FILM COATED ORAL at 20:53

## 2023-03-30 RX ADMIN — METHOCARBAMOL TABLETS 750 MG: 750 TABLET, COATED ORAL at 08:07

## 2023-03-30 RX ADMIN — HEPARIN SODIUM 5000 UNITS: 5000 INJECTION INTRAVENOUS; SUBCUTANEOUS at 20:52

## 2023-03-30 RX ADMIN — ATENOLOL 50 MG: 50 TABLET ORAL at 08:07

## 2023-03-30 RX ADMIN — METHOCARBAMOL TABLETS 750 MG: 750 TABLET, COATED ORAL at 12:29

## 2023-03-30 RX ADMIN — HEPARIN SODIUM 5000 UNITS: 5000 INJECTION INTRAVENOUS; SUBCUTANEOUS at 08:07

## 2023-03-30 RX ADMIN — DICLOFENAC SODIUM 4 G: 10 GEL TOPICAL at 09:03

## 2023-03-30 RX ADMIN — OXYCODONE HYDROCHLORIDE 10 MG: 10 TABLET ORAL at 00:12

## 2023-03-30 RX ADMIN — METFORMIN HYDROCHLORIDE 500 MG: 500 TABLET ORAL at 08:07

## 2023-03-30 RX ADMIN — METHOCARBAMOL TABLETS 750 MG: 750 TABLET, COATED ORAL at 20:52

## 2023-03-30 RX ADMIN — ALLOPURINOL 300 MG: 300 TABLET ORAL at 08:07

## 2023-03-30 RX ADMIN — ACETAMINOPHEN 650 MG: 325 TABLET ORAL at 22:26

## 2023-03-30 RX ADMIN — ACETAMINOPHEN 650 MG: 325 TABLET ORAL at 04:01

## 2023-03-30 RX ADMIN — TAMSULOSIN HYDROCHLORIDE 0.8 MG: 0.4 CAPSULE ORAL at 08:07

## 2023-03-30 RX ADMIN — METFORMIN HYDROCHLORIDE 500 MG: 500 TABLET ORAL at 16:06

## 2023-03-30 ASSESSMENT — PAIN SCALES - GENERAL
PAINLEVEL_OUTOF10: 3
PAINLEVEL_OUTOF10: 0
PAINLEVEL_OUTOF10: 3
PAINLEVEL_OUTOF10: 0
PAINLEVEL_OUTOF10: 0
PAINLEVEL_OUTOF10: 10
PAINLEVEL_OUTOF10: 0
PAINLEVEL_OUTOF10: 5
PAINLEVEL_OUTOF10: 8
PAINLEVEL_OUTOF10: 0
PAINLEVEL_OUTOF10: 0

## 2023-03-30 ASSESSMENT — PAIN DESCRIPTION - ORIENTATION
ORIENTATION: RIGHT

## 2023-03-30 ASSESSMENT — PAIN DESCRIPTION - LOCATION
LOCATION: HIP
LOCATION: HIP;LEG;KNEE

## 2023-03-30 ASSESSMENT — PAIN DESCRIPTION - DESCRIPTORS
DESCRIPTORS: ACHING
DESCRIPTORS: NUMBNESS

## 2023-03-30 ASSESSMENT — PAIN SCALES - WONG BAKER
WONGBAKER_NUMERICALRESPONSE: 0
WONGBAKER_NUMERICALRESPONSE: 8
WONGBAKER_NUMERICALRESPONSE: 8

## 2023-03-30 NOTE — DISCHARGE INSTR - COC
Continuity of Care Form    Patient Name: Endy Alvarado   :  1945  MRN:  [id number]    Admit date:  3/19/2023  Discharge date:  3/31/23    Code Status Order: Full Code   Advance Directives:     Admitting Physician:  Trevor Green MD  PCP: Meme Barroso MD    [facility]/5776-11  Discharging Unit Phone Number: [phone]    Emergency Contact:   Extended Emergency Contact Information  Primary Emergency Contact: Aniya Andrews  Mobile Phone: [phone]  Relation: Spouse  Secondary Emergency Contact: Travis Hamilton  Mobile Phone: [phone]  Relation: Child    Past Surgical History:  Past Surgical History:   Procedure Laterality Date    LUMBAR SPINE SURGERY      L3-S1 laminectomy and L4-S1 fusion with Dr. Livia Villagomez  2023    L2-L4 TLIF with Dr. Yoo Minor       Immunization History: There is no immunization history on file for this patient.     Active Problems:  Patient Active Problem List   Diagnosis Code    Lumbar disc disease with radiculopathy M51.16       Isolation/Infection:   Isolation            No Isolation          Patient Infection Status       None to display            Nurse Assessment:  Last Vital Signs: /69   Pulse 74   Temp 98.1 °F (36.7 °C) (Oral)   Resp 16   Ht 5' 8\" (1.727 m)   Wt 212 lb 8.4 oz (96.4 kg)   SpO2 96%   BMI 32.31 kg/m²     Last documented pain score (0-10 scale): Pain Level: 0  Last Weight:   Wt Readings from Last 1 Encounters:   23 212 lb 8.4 oz (96.4 kg)     Mental Status:  oriented, alert, coherent, logical, thought processes intact, able to concentrate and follow conversation, and pleasant    IV Access:  - None    Nursing Mobility/ADLs:  Walking   Independent  Transfer  Independent  Bathing  Assisted  Dressing  Assisted  Toileting  Assisted  Feeding  Independent  Med Admin  Assisted  Med Delivery   whole    Wound Care Documentation and

## 2023-03-30 NOTE — PROGRESS NOTES
within unit for stairs and transfers  OT  PT Equipment Recommendations  Equipment Needed: Yes  Mobility Devices: Marvinnie Micaela: Rolling     Assessment        SLP          Body mass index is 32.31 kg/m². Rehabilitation Diagnosis:   Orthopedic, 8.9, Other Orthopedic        Assessment and Plan:     Impairments: RLE>LLE weakness, sensory deficit, decreased balance, spine ROM     Lumbar DDD with bilateral radiculopathy  -s/p L2-4 TLIF (3/13 with Dr. Tia Vaughn)  -Wound care per Nsgy. Has ongoing mild serosanguinous drainage from left incision (overall gradually decreasing). Mild surrounding erythema is stable. -PT/OT     Acute blood loss anemia   -Post-surgical.   -Hgb now stable ~12      HTN  -atenolol, lisinopril (decreased dose), holding amlodipine -- trend now improved     HLD  -rosuvastatin     DM2 with hyperglycemia  -metformin, ISS -- now overall at goal     Gout  -allopurinol     Morbid Obesity   -Complicating assessment and treatment. Placing patient at risk for multiple co-morbidities as well as early death and contributing to the patient's presentation.   -Dietician consult. Counseling and education. Bladder: Urinary retention, h/o BPH  -Jackson removed for void trial (3/20)   -Monitor PVRs, SC prn >500cc -- Voiding without need for straight cath. -Flomax     Bowel   -High risk constipation   -senna+colace QOD, PRN miralax, MoM, and bisacodyl supp. Safety   -fall precautions     Pain control  -methocarbamol, prn oxycodone     DVT ppx  -Heparin BID per Nsgy    Rehab Progress: Interdisciplinary team conference was held today with entire rehab treatment team including PT, OT, SLP (if applicable), Dietician, RN, and SW. Discussion focused on progress toward rehab goals and discharge planning. Making progress. Working on functional mobility, balance, strength, compensatory strategies. Barriers include: R quad weakness, spine precautions, hip pain/OA.  We as a medical team, and I as the physician team

## 2023-03-30 NOTE — PROGRESS NOTES
He has a bedside commode he would like to try over his commode, if unable to use the commode, he may purchase toilet safety frames. He owns a shower stool, hopes to be able to step over ledge to stall, otherwise he will require a transfer tub bench. He has a reacher and is aware of back precautions. He will require a rolling walker at discharge  Activity Tolerance: Patient tolerated treatment well  Discharge Recommendations: Home with Home health OT; Home with assist PRN  OT Equipment Recommendations  Equipment Needed: Yes  Mobility Devices: Aide Franks: Rolling  Other: He owns a reacher,  BSC that he can use over his toilet if it fits in the space, shower stool. He would benefit from a toilet safety frame and transfer tub bench if other equipment doesn't work for him at this time. Safety Devices  Safety Devices in place: Yes  Type of devices: Gait belt;Call light within reach; Chair alarm in place; Left in chair    Patient Education  Education  Education Given To: Patient  Education Provided: ADL Function;Transfer Training;Mobility Training  Education Method: Demonstration;Verbal;Teach Back  Education Outcome: Verbalized understanding;Demonstrated understanding;Continued education needed    Plan  Occupational Therapy Plan  Times Per Day: Twice a day  Days Per Week: 5 Days  Hours Per Day: 1.5 hours  Therapy Duration: 2 Weeks  Current Treatment Recommendations: Strengthening;Balance training;Functional mobility training; Endurance training; Safety education & training;Equipment evaluation, education, & procurement;Patient/Caregiver education & training;Self-Care / ADL; Home management training    Goals  Patient Goals   Patient goals : \"I want to be able to walk around, get to the bathroom, do most of my bathing and dressing  -- my wife can do my socks\"  Short Term Goals  Time Frame for Short Term Goals: 1 week pt will. .  Short Term Goal 1: bathe with min assist and AD Goal Met  Short Term Goal 2: dress UB with set up, LB with min assist and AD Goal Met  Short Term Goal 3: toilet with min assist and grab bars Goal Met  Short Term Goal 4: transfers and functional mobility with CGA and AD Goal Met  Short Term Goal 5: increase activity tolerance to stand 3 min for ADL/IADL tasks Goal Met  Long Term Goals  Time Frame for Long Term Goals : 10-14 days pt will. .  Long Term Goal 1: bathe indep with AD Goal Met  Long Term Goal 2: dress UB and LB indep with AD as needed Goal Met  Long Term Goal 3: toilet indep with grab bar for safety Goal Met  Long Term Goal 4: transfers and functional mobiltiy indep with AD, simple meal prep indep Goal Met  Long Term Goal 5: assess for DME Goal Met                   Therapy Time   Individual Concurrent Group Co-treatment   Time In 0815         Time Out 0915         Minutes 60         Timed Code Treatment Minutes: 45 Minutes   Therapy Time     Individual Co-treatment   Time In [address] [facility]     Time Out [place]     Minutes 939 Kimber Brown, OTR/L 21

## 2023-03-30 NOTE — PROGRESS NOTES
Patient admitted to rehab with lumbar disc disease with radiculopathy. A/Ox4. Transfers with no device, ambulates with RW. Mobility restrictions: WBAT. On regular 5 carb diet, tolerating well. Medications taken whole. On Heparin for DVT prophylaxis. Skin: surgical incisions to lower back and right buttock healed, edges approximated. Oxygen: room air. LDA: none. Has been continent of bowel and  bladder. LBM 3/29. Chair/bed alarms in use and call light in reach. Will monitor for safety.  Electronically signed by Mai Easley RN, [address] [place] [address] on 3/30/23 at 175 [facility] EDT

## 2023-03-30 NOTE — CARE COORDINATION
[facility]/ [facility], [facility].    Referral received from  to follow for home care services. I will follow for needs, and speak with patient to verify demos.     Isaac Fontenot RN, BSN CTN  [facility] [phone]

## 2023-03-30 NOTE — CARE COORDINATION
03/30/23 1602   IMM Letter   IMM Letter given to Patient/Family/Significant other/Guardian/POA/by: presnetd to patient and wife who verbalized understanding of benefit by gena Villegas   IMM Letter date given: 03/30/23   IMM Letter time given: 2401

## 2023-03-30 NOTE — PLAN OF CARE
Problem: Discharge Planning  Goal: Discharge to home or other facility with appropriate resources  Outcome: Progressing     Problem: Skin/Tissue Integrity  Goal: Absence of new skin breakdown  Description: 1. Monitor for areas of redness and/or skin breakdown  2. Assess vascular access sites hourly  3. Every 4-6 hours minimum:  Change oxygen saturation probe site  4. Every 4-6 hours:  If on nasal continuous positive airway pressure, respiratory therapy assess nares and determine need for appliance change or resting period.   Outcome: Progressing     Problem: Safety - Adult  Goal: Free from fall injury  Outcome: Progressing     Problem: ABCDS Injury Assessment  Goal: Absence of physical injury  Outcome: Progressing     Problem: Pain  Goal: Verbalizes/displays adequate comfort level or baseline comfort level  Outcome: Progressing     Problem: Neurosensory - Adult  Goal: Achieves stable or improved neurological status  Recent Flowsheet Documentation  Taken 3/30/2023 1212 by So Barron RN  Achieves stable or improved neurological status: Assess for and report changes in neurological status     Problem: Skin/Tissue Integrity - Adult  Goal: Skin integrity remains intact  Recent Flowsheet Documentation  Taken 3/30/2023 1212 by So Barron RN  Skin Integrity Remains Intact: Monitor for areas of redness and/or skin breakdown     Problem: Gastrointestinal - Adult  Goal: Maintains or returns to baseline bowel function  Recent Flowsheet Documentation  Taken 3/30/2023 1212 by So Barron RN  Maintains or returns to baseline bowel function: Assess bowel function

## 2023-03-30 NOTE — CARE COORDINATION
SOCIAL WORK DISCHARGE SUMMARY        DATE OF DISCHARGE:  Friday, 3-      LOCATION:      [facility].  TIME:     10-12 noon        PHARMACY:  [facility] [place]        DME:     wh walker from [facility].       [place], [place]     Case Management   [phone]    3/30/2023  4:10 PM

## 2023-03-30 NOTE — CARE COORDINATION
Team Conference held today. Team reviewed progress, goals, DME and DC date. DC is planned for tomorrow. Home cAre orders:   sn/pt/ot. DME:   wheeled walker. DC on Friday, 3-. Met with pt and wife to review. IMM letter presnted. They have chosen [facility] form home care list provided and education given regarding CMS star rating system. The Plan for Transition of Care is related to the following treatment goals: to continue his progress in personal care and ambulation needs in home setting. The Patient and/or patient representative , wife  was provided with a choice of provider and agrees   with the discharge plan. [x] Yes [] No    Freedom of choice list was provided with basic dialogue that supports the patient's individualized plan of care/goals, treatment preferences and shares the quality data associated with the providers. [x] Yes [] No  Family will transport him with  at 10-12 noon. [facility] would [facility] [address] [place] [address]. They are interested in wh walker being ordered. No other concerns noted. They both denied transportation concerns.   [facility]     Case Management   [phone]    3/30/2023  4:06 PM

## 2023-03-30 NOTE — PROGRESS NOTES
Physical Therapy  Facility/Department: [address] REHAB  Rehabilitation Physical Therapy Treatment Note / Discharge Summary    NAME: Quentin Linares  : 1945 (78 y.o.)  MRN: [id number]  CODE STATUS: Full Code    Date of Service: 3/30/23       Restrictions:  Restrictions/Precautions: Fall Risk, Surgical Protocols  Position Activity Restriction  Spinal Precautions: No Bending, No Lifting, No Twisting  Spinal Precautions: Limited to lifting 5 lbs     SUBJECTIVE  Subjective  Subjective: Patient states his R hip/ anterior thigh was throbbing last night and had difficulty getting comfortable. Pain: 3/10 R hip    OBJECTIVE  Cognition  Overall Cognitive Status: WFL  Orientation  Overall Orientation Status: Within Functional Limits    Functional Mobility  Bed Mobility  Overall Assistance Level: Modified Independent  Additional Factors: Increased time to complete; Head of bed flat; Without handrails  Bridging  Assistance Level: Modified independent  Skilled Clinical Factors: Improved ability to perform bridge x 3 without increase in R hip pain  Roll Left  Assistance Level: Modified independent  Roll Right  Assistance Level: Modified independent  Skilled Clinical Factors: R hip pain decreases on R side  Sit to Supine  Assistance Level: Modified independent  Skilled Clinical Factors: Able to lift BLE onto mat table and maintain spinal precautions  Supine to Sit  Assistance Level: Modified independent  Skilled Clinical Factors: Able to log roll, use BUE to push up from bed, and maintain spinal precautions  Scooting  Assistance Level: Modified independent  Balance  Sitting Balance: Modified independent   Standing Balance: Supervision (RW)  Transfers  Surface: Wheelchair; To bed;From bed  Additional Factors: Set-up; Increased time to complete  Device: Walker (RW)  Sit to Stand  Assistance Level: Supervision  Stand to [employer] Level: Supervision  Car Transfer (performed 3/29)  Assistance Level: Stand by assist  Skilled stand at supervision back to chair at end of session. Call light near and fall precautions in place. Plan of care and update on status with wife present in room.    ASSESSMENT/PROGRESS TOWARDS GOALS  Assessment  Assessment: Patient is a 76 yo M with pmh HTN, HLD, DM2, obesity, gout, and lumbar DDD with radiculopathy (s/p prior L3-S1 laminectomy and L4-S1 fusion) presents to ARU below baseline function s/p L2-4 lumbar fusion on 3/13/23. Pt PLOF is Indep for functional mobility and ambulation without assistive device. Currently, pt is MOD I x 1 for bed mobility, supervision for sit <> stand transfers, SBA x 1 for ambulating household distance w/ RW, and SBA-CGA x 1 for ambulating community distances and navigating curb step w/ RW. Pt demonstrates occasional knee buckling on RLE but improved stability w/ improved R knee heel strike. Pt still has RLE quad weakness as evidenced by inability to achieve full knee extension w/ LAQ in sitting. Pt able to navigate 8 stairs w/ B handrails but does require CGA for safety; therefore would not recommend navigating basement stairs at home w/o HHPT. Pt plans to discharge from ARU tomorrow and would benefit from continued therapy w/ HHPT to increase strength, balance, activity tolerance, and functional mobility, and to decrease risk of falls at home.  Activity Tolerance: Patient limited by endurance;Patient tolerated treatment well;Patient limited by pain (R hip pain (ache) w/ ambulation and exercise)  Discharge Recommendations: Continue to assess pending progress;Home with Home health PT;Patient would benefit from continued therapy after discharge  PT Equipment Recommendations  Equipment Needed: Yes  Walker: Rolling    Goals  Patient Goals   Patient Goals : \"to return home\"  Short Term Goals  Time Frame for Short Term Goals: 1 week  Short Term Goal 1: Amb 150' w/ RW at supervision - unmet. Requires SBA-CGA x 1 for safety with community distance  Short Term Goal 2: Navigate curb

## 2023-03-30 NOTE — FLOWSHEET NOTE
Patient admitted to ARU on 3/19 with Dx of lumbar disc radiculopathy. A/Ox4. Transfers with RW x 1 with gaitbelt. Mobility restrictions: WBAT. On regular 5 carb diet, tolerating well. Medications taken whole. On Heparin for DVT prophylaxis. Skin: surgical incision to lower back and right upper buttock remain intact. Dressing intact. No drainage noted at hs. Pain on right hip area under control with routine Voltaren gel. Oxygen: room air. LDA: none. Has been continent of bowel and  bladder. LB 3/29. Chair/bed alarms in use and call light in reach. Will monitor for safety.

## 2023-03-30 NOTE — PLAN OF CARE
Problem: Discharge Planning  Goal: Discharge to home or other facility with appropriate resources  3/29/2023 2013 by Juancho Hernández RN  Outcome: Progressing  3/29/2023 1515 by Iliana Latham RN  Outcome: Progressing     Problem: Skin/Tissue Integrity  Goal: Absence of new skin breakdown  Description: 1. Monitor for areas of redness and/or skin breakdown  2. Assess vascular access sites hourly  3. Every 4-6 hours minimum:  Change oxygen saturation probe site  4. Every 4-6 hours:  If on nasal continuous positive airway pressure, respiratory therapy assess nares and determine need for appliance change or resting period.   3/29/2023 2013 by Juancho Hernández RN  Outcome: Progressing  3/29/2023 1515 by Iliana Latham RN  Outcome: Progressing     Problem: Safety - Adult  Goal: Free from fall injury  3/29/2023 2013 by Juancho Hernádnez RN  Outcome: Progressing  3/29/2023 1515 by Iliana Latham RN  Outcome: Progressing     Problem: ABCDS Injury Assessment  Goal: Absence of physical injury  Outcome: Progressing  Flowsheets (Taken 3/29/2023 0956 by Iliana Latham RN)  Absence of Physical Injury: Implement safety measures based on patient assessment     Problem: Pain  Goal: Verbalizes/displays adequate comfort level or baseline comfort level  3/29/2023 2013 by Juancho Hernández RN  Outcome: Progressing  3/29/2023 1515 by Iliana Latham RN  Outcome: Progressing     Problem: Neurosensory - Adult  Goal: Achieves stable or improved neurological status  Outcome: Progressing  Flowsheets (Taken 3/29/2023 0733 by Iliana Latham RN)  Achieves stable or improved neurological status: Assess for and report changes in neurological status  Goal: Absence of seizures  Outcome: Progressing  Goal: Remains free of injury related to seizures activity  Outcome: Progressing  Goal: Achieves maximal functionality and self care  Outcome: Progressing     Problem: Respiratory - Adult  Goal: Achieves optimal ventilation and oxygenation  Outcome: function: Assess bowel function  Goal: Maintains adequate nutritional intake  Outcome: Progressing     Problem: Genitourinary - Adult  Goal: Absence of urinary retention  Outcome: Progressing  Flowsheets (Taken 3/29/2023 0693 by Callie Sherwood RN)  Absence of urinary retention: Assess patients ability to void and empty bladder     Problem: Infection - Adult  Goal: Absence of infection at discharge  Outcome: Progressing  Goal: Absence of infection during hospitalization  Outcome: Progressing  Goal: Absence of fever/infection during anticipated neutropenic period  Outcome: Progressing     Problem: Metabolic/Fluid and Electrolytes - Adult  Goal: Electrolytes maintained within normal limits  Outcome: Progressing  Goal: Hemodynamic stability and optimal renal function maintained  Outcome: Progressing  Goal: Glucose maintained within prescribed range  Outcome: Progressing     Problem: Hematologic - Adult  Goal: Maintains hematologic stability  Outcome: Progressing

## 2023-03-31 VITALS
TEMPERATURE: 97.8 F | RESPIRATION RATE: 16 BRPM | HEIGHT: 68 IN | OXYGEN SATURATION: 97 % | HEART RATE: 67 BPM | BODY MASS INDEX: 32.14 KG/M2 | SYSTOLIC BLOOD PRESSURE: 130 MMHG | DIASTOLIC BLOOD PRESSURE: 79 MMHG | WEIGHT: 212.08 LBS

## 2023-03-31 LAB
GLUCOSE BLD-MCNC: 117 MG/DL (ref 70–99)
GLUCOSE BLD-MCNC: 122 MG/DL (ref 70–99)
PERFORMED ON: ABNORMAL
PERFORMED ON: ABNORMAL

## 2023-03-31 PROCEDURE — 6370000000 HC RX 637 (ALT 250 FOR IP): Performed by: PHYSICAL MEDICINE & REHABILITATION

## 2023-03-31 PROCEDURE — 6360000002 HC RX W HCPCS: Performed by: PHYSICAL MEDICINE & REHABILITATION

## 2023-03-31 PROCEDURE — 94760 N-INVAS EAR/PLS OXIMETRY 1: CPT

## 2023-03-31 RX ADMIN — METFORMIN HYDROCHLORIDE 500 MG: 500 TABLET ORAL at 08:58

## 2023-03-31 RX ADMIN — DICLOFENAC SODIUM 4 G: 10 GEL TOPICAL at 08:59

## 2023-03-31 RX ADMIN — OXYCODONE HYDROCHLORIDE 10 MG: 10 TABLET ORAL at 00:31

## 2023-03-31 RX ADMIN — TAMSULOSIN HYDROCHLORIDE 0.8 MG: 0.4 CAPSULE ORAL at 08:58

## 2023-03-31 RX ADMIN — ALLOPURINOL 300 MG: 300 TABLET ORAL at 08:58

## 2023-03-31 RX ADMIN — HEPARIN SODIUM 5000 UNITS: 5000 INJECTION INTRAVENOUS; SUBCUTANEOUS at 08:59

## 2023-03-31 RX ADMIN — METHOCARBAMOL TABLETS 750 MG: 750 TABLET, COATED ORAL at 08:58

## 2023-03-31 RX ADMIN — LISINOPRIL 10 MG: 10 TABLET ORAL at 08:58

## 2023-03-31 RX ADMIN — SENNOSIDES AND DOCUSATE SODIUM 1 TABLET: 50; 8.6 TABLET ORAL at 08:58

## 2023-03-31 RX ADMIN — ATENOLOL 50 MG: 50 TABLET ORAL at 08:58

## 2023-03-31 ASSESSMENT — PAIN SCALES - GENERAL
PAINLEVEL_OUTOF10: 6
PAINLEVEL_OUTOF10: 0
PAINLEVEL_OUTOF10: 10
PAINLEVEL_OUTOF10: 0
PAINLEVEL_OUTOF10: 0

## 2023-03-31 ASSESSMENT — PAIN DESCRIPTION - LOCATION: LOCATION: HIP

## 2023-03-31 ASSESSMENT — PAIN DESCRIPTION - DESCRIPTORS: DESCRIPTORS: ACHING

## 2023-03-31 ASSESSMENT — PAIN - FUNCTIONAL ASSESSMENT: PAIN_FUNCTIONAL_ASSESSMENT: PREVENTS OR INTERFERES SOME ACTIVE ACTIVITIES AND ADLS

## 2023-03-31 ASSESSMENT — PAIN DESCRIPTION - ORIENTATION: ORIENTATION: RIGHT

## 2023-03-31 NOTE — PROGRESS NOTES
Patient admitted to rehab with lumbar disc disease with radiculopathy. A/Ox4. Transfers with no device, ambulates with RW. Mobility restrictions: WBAT. On regular 5 carb diet, tolerating well. Medications taken whole. On Heparin for DVT prophylaxis. Skin: surgical incision to lumbar spine and right buttock intact, no drainage noted edges well approximated . Oxygen: room air. LDA: none. Has been continent of bowel and  bladder. LBM 3/30. Chair/bed alarms in use and call light in reach. Will monitor for safety.  Electronically signed by Rosa Isela Luna RN, [address] [place] [address] on 3/31/23 at 9:34 AM EDT

## 2023-03-31 NOTE — FLOWSHEET NOTE
Patient admitted to ARU on 3/19 with Dx of s/p TLIF L2-L4, lumbar disc disease with radiculopathy. A/Ox4. Transfers with no device, ambulates with RW. Mobility restrictions: WBAT. On regular 5 carb diet, tolerating well. Medications taken whole. On Heparin for DVT prophylaxis. Skin: surgical incisions to lower back and right buttock resolving, edges approximated. Dressing still clean dry and intact. Oxygen: room air. LDA: none. Has been continent of bowel and  bladder. LBM 3/29. Chair/bed alarms in use and call light in reach. Will monitor for safety. D/C today 3/31. Wife and son to pick him up late morning. Wheeled walker to be delivered to his room.

## 2023-03-31 NOTE — PLAN OF CARE
Problem: Discharge Planning  Goal: Discharge to home or other facility with appropriate resources  3/31/2023 0931 by Jennifer Terrell RN  Outcome: Progressing     Problem: Skin/Tissue Integrity  Goal: Absence of new skin breakdown  Description: 1. Monitor for areas of redness and/or skin breakdown  2. Assess vascular access sites hourly  3. Every 4-6 hours minimum:  Change oxygen saturation probe site  4. Every 4-6 hours:  If on nasal continuous positive airway pressure, respiratory therapy assess nares and determine need for appliance change or resting period.   3/31/2023 0931 by Jennifer Terrell RN  Outcome: Progressing     Problem: Safety - Adult  Goal: Free from fall injury  3/31/2023 0931 by Jennifer Terrell RN  Outcome: Progressing  Flowsheets (Taken 3/31/2023 0694)  Free From Fall Injury: Instruct family/caregiver on patient safety     Problem: ABCDS Injury Assessment  Goal: Absence of physical injury  3/31/2023 0931 by Jennifer Terrell RN  Outcome: Progressing  Flowsheets (Taken 3/31/2023 9320)  Absence of Physical Injury: Implement safety measures based on patient assessment     Problem: Pain  Goal: Verbalizes/displays adequate comfort level or baseline comfort level  3/31/2023 0931 by Jennifer Terrell RN  Outcome: Progressing

## 2023-03-31 NOTE — PROGRESS NOTES
Patient has been discharged per MD orders. Patient verbalizes understanding of all instructions, medications, and follow up. All belongings have been gathered and packed. Family at bedside to transport patient from hospital. Medications picked up from pharmacy.  Electronically signed by Radha Alvarez RN, [address] [place] [address] on 3/31/23 at 12:13 PM EDT

## 2023-03-31 NOTE — PLAN OF CARE
Problem: Discharge Planning  Goal: Discharge to home or other facility with appropriate resources  3/31/2023 0204 by Jaydon Sapp RN  Outcome: Progressing  Problem: Skin/Tissue Integrity  Goal: Absence of new skin breakdown  Description: 1. Monitor for areas of redness and/or skin breakdown  2. Assess vascular access sites hourly  3. Every 4-6 hours minimum:  Change oxygen saturation probe site  4. Every 4-6 hours:  If on nasal continuous positive airway pressure, respiratory therapy assess nares and determine need for appliance change or resting period.   3/31/2023 0204 by Jaydon Sapp RN  Outcome: Progressing  Problem: Safety - Adult  Goal: Free from fall injury  3/31/2023 0204 by Jaydon Sapp RN  Outcome: Progressing  Problem: Pain  Goal: Verbalizes/displays adequate comfort level or baseline comfort level  3/31/2023 0204 by Jaydon Sapp RN  Outcome: Progressing    Problem: Skin/Tissue Integrity - Adult  Goal: Skin integrity remains intact  3/31/2023 0204 by Jaydon Sapp RN  Outcome: Progressing  3/31/2023 0202 by Jaydon Sapp RN  Outcome: Progressing  Flowsheets (Taken 3/30/2023 1212 by Collette Bhagat RN)  Skin Integrity Remains Intact: Monitor for areas of redness and/or skin breakdown     Problem: Skin/Tissue Integrity - Adult  Goal: Incisions, wounds, or drain sites healing without S/S of infection  3/31/2023 0204 by Jaydon Sapp RN  Outcome: Progressing  3/31/2023 0202 by Jaydon Sapp RN  Outcome: Progressing     Problem: Skin/Tissue Integrity - Adult  Goal: Oral mucous membranes remain intact  3/31/2023 0204 by Jaydon Sapp RN  Outcome: Progressing  3/31/2023 0202 by Jaydon Sapp RN  Outcome: Progressing     Problem: Musculoskeletal - Adult  Goal: Return mobility to safest level of function  3/31/2023 0204 by Jaydon Sapp RN  Outcome: Progressing  3/31/2023 0202 by Jaydon Sapp RN  Outcome: Progressing  Goal: Maintain proper alignment of affected body part  3/31/2023 0204 by Igor Frey RN  Outcome: Progressing  3/31/2023 0202 by Igor Frey RN  Outcome: Progressing  Goal: Return ADL status to a safe level of function  3/31/2023 0204 by Igor Frey RN  Outcome: Progressing  3/31/2023 0202 by Igor Frey RN  Outcome: Progressing     Problem: Gastrointestinal - Adult  Goal: Minimal or absence of nausea and vomiting  3/31/2023 0204 by Igor Frey RN  Outcome: Progressing  3/31/2023 0202 by Igor Frey RN  Outcome: Progressing  Goal: Maintains or returns to baseline bowel function  3/31/2023 0204 by Igor Frey RN  Outcome: Progressing  3/31/2023 0202 by Igor Frey RN  Outcome: Progressing  Flowsheets (Taken 3/30/2023 1212 by Delbert Hudson RN)  Maintains or returns to baseline bowel function: Assess bowel function  Goal: Maintains adequate nutritional intake  3/31/2023 0204 by Igor Frey RN  Outcome: Progressing  3/31/2023 0202 by Igor Frey RN  Outcome: Progressing     Problem: Genitourinary - Adult  Goal: Absence of urinary retention  3/31/2023 0204 by Igor Frey RN  Outcome: Progressing  3/31/2023 0202 by Igor Frey RN  Outcome: Progressing     Problem: Infection - Adult  Goal: Absence of infection at discharge  3/31/2023 0204 by Igor Frey RN  Outcome: Progressing  3/31/2023 0202 by Igor Frey RN  Outcome: Progressing  Goal: Absence of infection during hospitalization  3/31/2023 0204 by Igor Frey RN  Outcome: Progressing  3/31/2023 0202 by Igor Frey RN  Outcome: Progressing  Goal: Absence of fever/infection during anticipated neutropenic period  3/31/2023 0204 by Igor Frey RN  Outcome: Progressing  3/31/2023 0202 by Igor Frey RN  Outcome: Progressing

## 2023-03-31 NOTE — CARE COORDINATION
SW spoke to charge nurse who stated that family intends to  patient at 10am and he still needs a walker. SW spoke to Renee Livingston who advised that he was on her list for delivery. SW stated that the family mentioned toilet rails, however, if therapy doesn't recommend it we can't order it. SW advised nurse to call us back if there are additional equipment needs so we can reach out to MD to get orders. Respectfully submitted,    Brandi MUELLER, [facility]   [phone]    Electronically signed by ERVIN Chung, LSW on 3/31/2023 at 9:06 AM

## 2023-03-31 NOTE — CARE COORDINATION
[facility]/ [address] order noted, all docs needed have been faxed to [facility] for home care services.     Home care to see patient within 24-48 hrs    Nayan Hernandez RN, BSN CTN  [facility] [phone]

## 2023-03-31 NOTE — DISCHARGE SUMMARY
with Dr. Leonardo Baig)  -Wound care per [facility]. Has ongoing mild serosanguinous drainage from left incision (overall gradually decreasing). Mild surrounding erythema is stable. -PT/OT     Acute blood loss anemia   -Post-surgical.   -Hgb now stable ~12      HTN--> hypotension  -Trend now improved on atenolol, lisinopril (decreased dose)  -holding amlodipine   -D/w patient that BP may be lower than usual due to increase in Flomax at [facility] and use of pain medication/muscle relaxant. HLD  -rosuvastatin     DM2 with hyperglycemia  -metformin, ISS -- now overall at goal     Gout  -allopurinol     Morbid Obesity   -Complicating assessment and treatment. Placing patient at risk for multiple co-morbidities as well as early death and contributing to the patient's presentation.   -Dietician consult. Counseling and education. Urinary retention, h/o BPH  -Jackson removed for void trial (3/20)   -Now voiding without need for straight cath. -Flomax (dose increased at OSH)  -f/u Dr. Seema Avila      Pain control  -methocarbamol, prn oxycodone     DVT ppx  -Heparin BID per [facility]    Discharge Exam:  Const: Alert. No distress, pleasant. HEENT: Normocephalic, atraumatic. Normal sclera/conjunctiva. MMM. CV: Regular rate and rhythm. Resp: No respiratory distress. Lungs CTAB. Abd: Soft, nontender, nondistended, NABS+   Ext: No edema. MSK: Decreased spine ROM  Neuro: Alert, oriented, appropriately interactive.  Strength 4+/5 bilateral hip flexion, 4/5 right knee extension  Psych: Cooperative, appropriate mood and affect      Discharge Functional Status:    Physical therapy:  Bed Mobility:  Overall Assistance Level: Modified Independent  Additional Factors: Increased time to complete, Head of bed flat, Without handrails  Sit>supine:  Assistance Level: Modified independent  Skilled Clinical Factors: Able to lift BLE onto mat table and maintain spinal precautions  Supine>sit:  Assistance Level: Modified independent  Skilled Clinical sponge  Assistance Level: Modified independent  Skilled Clinical Factors: long sponge for LE, stood to bathe buttocks with caution, grab bar for support  UE Dressing  Assistance Level: Modified independent  Skilled Clinical Factors: carried clothing over walker indep  LE Dressing  Equipment Provided: Reachers  Assistance Level: Modified independent  Skilled Clinical Factors: able to thread briefs and pants over feet, reacher as needed, grab bar for balance - pants over hips per self with grab bar for balance  Putting On/Taking Off Footwear  Assistance Level: Independent  Skilled Clinical Factors: able to cross legs to reach feet for socks and shoes  Toileting  Assistance Level: Modified independent  Skilled Clinical Factors: stood at commode to urinate indep  Transfers: Toilet Transfers  Technique:  (amb)  Equipment: Grab bars  Assistance Level: Stand by assist  Skilled Clinical Factors: RW amb to/from BR. Pt reports he can use his BSC over toilet at home. Tub/Shower Transfers  Type: Shower  Transfer From: Rolling walker  Transfer To: Shower chair with back  Assistance Level: Modified independent  Skilled Clinical Factors: grab bar to assist, RW, cautious regarding right knee buckling  IADLs:  Meal Prep  Meal Prep Level:  (RW)  Meal Prep Level of Assistance: Stand by assistance, Contact guard assistance  Meal Preparation: Made egg on stove top. Pt gathered items using walker basket. Pt able to manage controls of oven, and safely place cooked egg into small container to carry to table in walker basket. Pt reports he will eat in the kitchen at a table, which is close to counters. Money Management     [facility] Management     Assessment:  Assessment: Pt met all goals. He is able to bathe indep, dress indep. Transfers and functional mobility are indep using rolling walker including carrying clothing.   Pt is to be discharged Friday 3/31, home